# Patient Record
Sex: FEMALE | Race: WHITE
[De-identification: names, ages, dates, MRNs, and addresses within clinical notes are randomized per-mention and may not be internally consistent; named-entity substitution may affect disease eponyms.]

---

## 2019-03-12 ENCOUNTER — HOSPITAL ENCOUNTER (INPATIENT)
Dept: HOSPITAL 97 - ER | Age: 69
LOS: 10 days | DRG: 870 | End: 2019-03-22
Attending: INTERNAL MEDICINE | Admitting: INTERNAL MEDICINE
Payer: COMMERCIAL

## 2019-03-12 DIAGNOSIS — E86.0: ICD-10-CM

## 2019-03-12 DIAGNOSIS — N17.9: ICD-10-CM
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
## 2019-03-12 DIAGNOSIS — R65.20: ICD-10-CM

## 2019-03-12 DIAGNOSIS — Z78.1: ICD-10-CM

## 2019-03-12 DIAGNOSIS — K27.9: ICD-10-CM

## 2019-03-12 DIAGNOSIS — Z66: ICD-10-CM

## 2019-03-12 DIAGNOSIS — T42.4X5A: ICD-10-CM

## 2019-03-12 DIAGNOSIS — Z87.891: ICD-10-CM

## 2019-03-12 DIAGNOSIS — J96.02: ICD-10-CM

## 2019-03-12 DIAGNOSIS — E83.51: ICD-10-CM

## 2019-03-12 DIAGNOSIS — I13.0: ICD-10-CM

## 2019-03-12 DIAGNOSIS — E87.70: ICD-10-CM

## 2019-03-12 DIAGNOSIS — G92: ICD-10-CM

## 2019-03-12 DIAGNOSIS — I50.23: ICD-10-CM

## 2019-03-12 DIAGNOSIS — J96.01: ICD-10-CM

## 2019-03-12 DIAGNOSIS — I46.9: ICD-10-CM

## 2019-03-12 DIAGNOSIS — N18.4: ICD-10-CM

## 2019-03-12 DIAGNOSIS — Z94.0: ICD-10-CM

## 2019-03-12 DIAGNOSIS — J44.9: ICD-10-CM

## 2019-03-12 DIAGNOSIS — D63.8: ICD-10-CM

## 2019-03-12 DIAGNOSIS — E11.22: ICD-10-CM

## 2019-03-12 DIAGNOSIS — E44.0: ICD-10-CM

## 2019-03-12 DIAGNOSIS — B18.2: ICD-10-CM

## 2019-03-12 DIAGNOSIS — E87.5: ICD-10-CM

## 2019-03-12 DIAGNOSIS — E03.9: ICD-10-CM

## 2019-03-12 DIAGNOSIS — A41.9: Primary | ICD-10-CM

## 2019-03-12 DIAGNOSIS — F41.9: ICD-10-CM

## 2019-03-12 DIAGNOSIS — K74.60: ICD-10-CM

## 2019-03-12 DIAGNOSIS — J18.9: ICD-10-CM

## 2019-03-12 DIAGNOSIS — Z94.4: ICD-10-CM

## 2019-03-12 LAB
ALBUMIN SERPL BCP-MCNC: 2.8 G/DL (ref 3.4–5)
ALP SERPL-CCNC: 121 U/L (ref 45–117)
ALT SERPL W P-5'-P-CCNC: 21 U/L (ref 12–78)
AST SERPL W P-5'-P-CCNC: 13 U/L (ref 15–37)
BUN BLD-MCNC: 94 MG/DL (ref 7–18)
CKMB CREATINE KINASE MB: 3.1 NG/ML (ref 0.3–3.6)
COHGB MFR BLDA: 0.8 % (ref 0–1.5)
GLUCOSE SERPLBLD-MCNC: 143 MG/DL (ref 74–106)
HCT VFR BLD CALC: 30.9 % (ref 36–45)
INR BLD: 1.27
LIPASE SERPL-CCNC: 60 U/L (ref 73–393)
LYMPHOCYTES # SPEC AUTO: 0.2 K/UL (ref 0.7–4.9)
MORPHOLOGY BLD-IMP: (no result)
OXYHGB MFR BLDA: 96.8 % (ref 94–97)
PMV BLD: 9.3 FL (ref 7.6–11.3)
POTASSIUM SERPL-SCNC: 5.5 MMOL/L (ref 3.5–5.1)
RBC # BLD: 3.21 M/UL (ref 3.86–4.86)
SAO2 % BLDA: 98.7 % (ref 92–98.5)
TROPONIN (EMERG DEPT USE ONLY): 0.17 NG/ML (ref 0–0.04)
TSH SERPL DL<=0.05 MIU/L-ACNC: 2.87 UIU/ML (ref 0.36–3.74)
UA COMPLETE W REFLEX CULTURE PNL UR: (no result)
YEAST HYPHAE URNS QL MICRO: PRESENT

## 2019-03-12 PROCEDURE — 71045 X-RAY EXAM CHEST 1 VIEW: CPT

## 2019-03-12 PROCEDURE — 87070 CULTURE OTHR SPECIMN AEROBIC: CPT

## 2019-03-12 PROCEDURE — 94660 CPAP INITIATION&MGMT: CPT

## 2019-03-12 PROCEDURE — 81003 URINALYSIS AUTO W/O SCOPE: CPT

## 2019-03-12 PROCEDURE — 87040 BLOOD CULTURE FOR BACTERIA: CPT

## 2019-03-12 PROCEDURE — 84443 ASSAY THYROID STIM HORMONE: CPT

## 2019-03-12 PROCEDURE — 94003 VENT MGMT INPAT SUBQ DAY: CPT

## 2019-03-12 PROCEDURE — 82550 ASSAY OF CK (CPK): CPT

## 2019-03-12 PROCEDURE — 84484 ASSAY OF TROPONIN QUANT: CPT

## 2019-03-12 PROCEDURE — 87086 URINE CULTURE/COLONY COUNT: CPT

## 2019-03-12 PROCEDURE — 84100 ASSAY OF PHOSPHORUS: CPT

## 2019-03-12 PROCEDURE — 94760 N-INVAS EAR/PLS OXIMETRY 1: CPT

## 2019-03-12 PROCEDURE — 82553 CREATINE MB FRACTION: CPT

## 2019-03-12 PROCEDURE — 86900 BLOOD TYPING SEROLOGIC ABO: CPT

## 2019-03-12 PROCEDURE — 85025 COMPLETE CBC W/AUTO DIFF WBC: CPT

## 2019-03-12 PROCEDURE — 83690 ASSAY OF LIPASE: CPT

## 2019-03-12 PROCEDURE — 74018 RADEX ABDOMEN 1 VIEW: CPT

## 2019-03-12 PROCEDURE — 80202 ASSAY OF VANCOMYCIN: CPT

## 2019-03-12 PROCEDURE — 86850 RBC ANTIBODY SCREEN: CPT

## 2019-03-12 PROCEDURE — 85018 HEMOGLOBIN: CPT

## 2019-03-12 PROCEDURE — 36415 COLL VENOUS BLD VENIPUNCTURE: CPT

## 2019-03-12 PROCEDURE — 84439 ASSAY OF FREE THYROXINE: CPT

## 2019-03-12 PROCEDURE — 83880 ASSAY OF NATRIURETIC PEPTIDE: CPT

## 2019-03-12 PROCEDURE — 96372 THER/PROPH/DIAG INJ SC/IM: CPT

## 2019-03-12 PROCEDURE — 82962 GLUCOSE BLOOD TEST: CPT

## 2019-03-12 PROCEDURE — 0DH67UZ INSERTION OF FEEDING DEVICE INTO STOMACH, VIA NATURAL OR ARTIFICIAL OPENING: ICD-10-PCS

## 2019-03-12 PROCEDURE — 80048 BASIC METABOLIC PNL TOTAL CA: CPT

## 2019-03-12 PROCEDURE — 81015 MICROSCOPIC EXAM OF URINE: CPT

## 2019-03-12 PROCEDURE — 94002 VENT MGMT INPAT INIT DAY: CPT

## 2019-03-12 PROCEDURE — 80076 HEPATIC FUNCTION PANEL: CPT

## 2019-03-12 PROCEDURE — 70450 CT HEAD/BRAIN W/O DYE: CPT

## 2019-03-12 PROCEDURE — 83735 ASSAY OF MAGNESIUM: CPT

## 2019-03-12 PROCEDURE — 94640 AIRWAY INHALATION TREATMENT: CPT

## 2019-03-12 PROCEDURE — 84145 PROCALCITONIN (PCT): CPT

## 2019-03-12 PROCEDURE — 0BH17EZ INSERTION OF ENDOTRACHEAL AIRWAY INTO TRACHEA, VIA NATURAL OR ARTIFICIAL OPENING: ICD-10-PCS

## 2019-03-12 PROCEDURE — 99291 CRITICAL CARE FIRST HOUR: CPT

## 2019-03-12 PROCEDURE — 85610 PROTHROMBIN TIME: CPT

## 2019-03-12 PROCEDURE — 76770 US EXAM ABDO BACK WALL COMP: CPT

## 2019-03-12 PROCEDURE — 86901 BLOOD TYPING SEROLOGIC RH(D): CPT

## 2019-03-12 PROCEDURE — 87088 URINE BACTERIA CULTURE: CPT

## 2019-03-12 PROCEDURE — 93005 ELECTROCARDIOGRAM TRACING: CPT

## 2019-03-12 PROCEDURE — 85730 THROMBOPLASTIN TIME PARTIAL: CPT

## 2019-03-12 PROCEDURE — 5A1955Z RESPIRATORY VENTILATION, GREATER THAN 96 CONSECUTIVE HOURS: ICD-10-PCS

## 2019-03-12 PROCEDURE — 85014 HEMATOCRIT: CPT

## 2019-03-12 PROCEDURE — 83605 ASSAY OF LACTIC ACID: CPT

## 2019-03-12 PROCEDURE — 82805 BLOOD GASES W/O2 SATURATION: CPT

## 2019-03-12 PROCEDURE — 31500 INSERT EMERGENCY AIRWAY: CPT

## 2019-03-12 PROCEDURE — 87205 SMEAR GRAM STAIN: CPT

## 2019-03-12 RX ADMIN — Medication SCH ML: at 21:00

## 2019-03-12 RX ADMIN — SODIUM CHLORIDE SCH: 0.9 INJECTION, SOLUTION INTRAVENOUS at 17:00

## 2019-03-12 NOTE — RAD REPORT
EXAM DESCRIPTION:  RAD - Chest Single View - 3/12/2019 2:15 pm

 

CLINICAL HISTORY:  Cough and congestion, hypotension, bradycardia, anorexia

 

COMPARISON:  April 2016

 

TECHNIQUE:  AP portable chest image was obtained 1359 hours .

 

FINDINGS:  Lungs are fibrotic as a baseline. Focal mass density is present right suprahilar region. T
his was not present on the prior study. Pleural effusion is present on the right relatively small in 
size. Wedge-shaped opacification in the medial right base could be atelectasis, infiltrate or a combi
nation. There is a large left pleural effusion obscuring the left hemidiaphragm and left heart border
. There is likely atelectasis at the left base. Heart size is mostly obscured probably enlarged. Uppe
r lobe vasculature within normal limits. No pneumothorax. No acute bony abnormality seen. No acute ao
rtic findings suspected.

 

IMPRESSION:  Large left pleural effusion with likely atelectasis in the left base. Mass and infiltrat
e of the left base are not excluded.

 

Infiltrate, atelectasis or a combination in the medial right lung base.

 

Masslike density right suprahilar region. Mass an round pneumonia can have a similar appearance. Foll
ow-up CT chest imaging would be recommended, preferably with IV contrast.

## 2019-03-12 NOTE — RAD REPORT
EXAM DESCRIPTION:  RAD - Chest Single View - 3/12/2019 5:41 pm

 

CLINICAL HISTORY:  tube placement

Chest pain.

 

COMPARISON:  Chest Single View dated 3/12/2019; Chest Single View dated 4/5/2016

 

FINDINGS:  Portable technique limits examination quality.

 

Tip of the ET tube is above the claire. Small left pleural effusion is again noted. Right suprahilar 
density is again noted, without change since recent comparative study. Mildly impacted fracture the p
roximal left humerus is present.

## 2019-03-12 NOTE — RAD REPORT
EXAM DESCRIPTION:  CT - Head Brain Wo Cont - 3/12/2019 2:05 pm

 

CLINICAL HISTORY:  Hypotension, bradycardia, transient alteration of awareness

 

COMPARISON:  CT head April 2018

 

TECHNIQUE:  Axial 5 mm thick images of the head were obtained without IV contrast.

 

All CT scans are performed using dose optimization technique as appropriate and may include automated
 exposure control or mA/KV adjustment according to patient size.

 

FINDINGS:  No intracranial hemorrhage, mass, edema or shift of mid-line structures. No acute infarcti
on changes seen. No abnormal extra-axial fluid collections. Atrophy and chronic ischemic changes are 
present matching the comparison. Ventricles are in proportion to volume loss.

 

Partially visualized paranasal sinuses are clear. Right mastoid air cells are clear. There is partial
 opacification of the left mastoid air cells new from 2016.

 

No acute bony findings.

 

 

IMPRESSION:  Atrophy and chronic ischemic change similar to comparison. No acute intracranial finding
.

 

Suspected left mastoiditis. Correlation is needed with any symptoms.

## 2019-03-12 NOTE — XMS REPORT
Clinical Summary

 Created on:2019



Patient:Jewels Pfeiffer

Sex:Female

:1950

External Reference #:[id number]





Demographics







 Address  [address]



   [place] [address]

 

 Home Phone  [phone]

 

 Mobile Phone  [phone]

 

 Preferred Language  English

 

 Marital Status  Unknown

 

 [employer] Affiliation  Unknown

 

 Race  White

 

 Ethnic Group  Not  or 









Author







 Organization  [facility]

 

 Address  [address] [place] [address]









Support







 Name  Relationship  Address  Phone

 

 Jewels Pfeiffer  Unavailable  [address]  [phone]



     [place] [address]  









Care Team Providers







 Name  Role  Phone

 

 Nata Palomino MD  Primary Care Provider  [phone]









Allergies







 Active Allergy  Reactions  Severity  Noted Date  Comments

 

 Tape Adherent  Other (See Comments)    2013  TEARS SKIN







Medications







 Medication  Sig  Dispensed  Refills  Start Date  End Date  Status

 

 fluticasone-salmetero  Inhale 2 puffs by    0      Active



 l (ADVAIR) 100-50  mouth via inhaler          



 mcg/dose diskus  2 (two) times          



 inhalerIndications:  daily.          



 COPD Associated with            



 Chronic Bronchitis            

 

 ALPRAZolam (XANAX XR)  Take 0.5 mg by    0      Active



 0.5 MG 24 hr tablet  mouth 2 (two)          



   times daily.          

 

 tiotropium (SPIRIVA)  Inhale 18 mcg    0      Active



 18 mcg inhalation  into the lungs          



 capsule  daily.          

 

 ALBUTEROL SULFATE  Inhale 2 puffs by    0      Active



 (VENTOLIN HFA INHL)  mouth via inhaler          



   4 (four) times          



   daily.          

 

 levothyroxine  Take 125 mcg by    0      Active



 (SYNTHROID,  mouth daily.          



 LEVOTHROID) 125 MCG            



 tablet            

 

 guaiFENesin (MUCINEX)  Take 600 mg by    0      Active



 600 mg 12 hr tablet  mouth 2 (two)          



   times daily.          

 

 docusate sodium  Take 100 mg by    0      Active



 (COLACE) 100 MG  mouth 3 (three)          



 capsule  times daily.          

 

 gabapentin  Take 400 mg by    0      Active



 (NEURONTIN) 400 MG  mouth 4 (four)          



 capsuleIndications:  times daily.          



 Neuropathic Pain            

 

 metoprolol  Take 12.5 mg by    0      Active



 (LOPRESSOR) 25 MG  mouth 2 (two)          



 tablet  times daily .          

 

 oxyCODONE (OXYCONTIN)  Take 40 mg by    0      Active



 40 MG 12 hr tablet  mouth every 12          



   (twelve) hours.          

 

 predniSONE  Take 10 mg (2  40 tablet  1  2016    Active



 (DELTASONE) 10 MG  tabs) daily for 3          



 tablet  days, and then 5          



   mg daily until          



   told to stop by          



   Pulmonary          



   Medicine.          

 

 traMADol (ULTRAM) 50  Take 1 tablet (50  20 tablet  0  2016    Active



 mg tablet  mg total) by          



   mouth every 8          



   (eight) hours as          



   needed (moderate          



   pain). Max Daily          



   Amount: 150 mg          

 

 escitalopram oxalate  Take 1 tablet (10  30 tablet  1  2016    Active



 (LEXAPRO) 10 MG  mg total) by          



 tablet  mouth daily.          

 

 polyethylene glycol  Take 17 grams    0  2016    Active



 (GLYCOLAX) 17 gram  twice a day.          



 packet            

 

 tacrolimus (PROGRAF)  3 capsules AM/ 2  150 capsule  11  2017    Active



 1 MG capsule  capsules PM.          







Active Problems







 Problem  Noted Date

 

 Anxiety  2016

 

 Acute on chronic respiratory failure with hypoxia  2016

 

 Elevated liver enzymes  2016

 

 Slow transit constipation  2016

 

 Chronic indwelling Gonsales catheter  2016

 

 Hyponatremia  2016

 

 Acute kidney injury  2016

 

 Chronic diastolic CHF (congestive heart failure)  2016

 

 Lung nodule  2016

 

 COPD exacerbation  11/10/2016

 

 Tibial fracture  2016

 

 Immunosuppressed status  2014

 

 Left bundle branch block  2014

 

 Femur fracture, right  2014

 

 Status post liver transplant  2013









 Last Assessment & Plan: 







 Stable LFT. Continue immunosuppresion









 Hepatitis C  2013









 Last Assessment & Plan: 







 HCV RNA undetectable . Likely has SVR. Will recheck HCV RNA









 Hypothyroid  2013

 

 COPD with asthma  2013









 Last Assessment & Plan: 







 To follow with pulmonary.









 Depression  2013









 Last Assessment & Plan: 







 Needs to see Dr. Ruff









 Cervical stenosis of spinal canal  2013









 Last Assessment & Plan: 







 S/p Laminectomy. To follow with Neurosurgery.







Immunizations







 Name  Dates Previously Given  Next Due

 

 Influenza Three-TIV PF 5+ YRS 2016  







Social History







 Tobacco Use  Types  Packs/Day  Years Used  Date

 

 Current Every Day Smoker    1  45  









 Smokeless Tobacco: Never Used      









 Tobacco Cessation: Ready to Quit: Yes









 Alcohol Use  Drinks/Week  oz/Week  Comments

 

 No      









 Sex Assigned at Birth  Date Recorded

 

 Not on file  









 Job Start Date  Occupation  Industry

 

 Not on file  Not on file  Not on file









 Travel History  Travel Start  Travel End









 No recent travel history available.







Last Filed Vital Signs

Not on file



Plan of Treatment







 Health Maintenance  Due Date  Last Done  Comments

 

 INFLUENZA VACCINE  10/01/2018    







Implants







 Implanted  Type  Area    Device  Shelf  Model /



         Identifier  Expiration  Serial /



           Date  Lot

 

 Sealant,Floseal Hemostatic Matrix 10ml - [id number]  Cement/F  N/A: Spine  [place]
    2014  [id number] /



 Implanted: Qty: 1 on 2013 by Bruce Valdez MD  iller/Ad  Cervical  
BIOSCIENCE       /



   hesive    FORMER FUSION      [id number]



       MEDICAL      

 

 Bone Graft,Sub Vitoss Foam Pack Bioactive 10cc - [id number]  Cement/F  N/A: Spine
  FLO SPINE    2015  [id number] /



 Implanted: Qty: 1 on 2013 by Bruce Valdez MD  iller/Ad  Cervical  
       /



   hesive          [id number]

 

 K-Wire,Gamma3 Small 3.4v500kd - [id number]  Fracture  Right: Hip  [place]    2019  [id number] /



 Implanted: Qty: 2 on 2014 by Fran Obando MD  /Fixatio    
Orthopaedics       /



   n          [id number]

 

 Nail,Gamma3 Trochanteric Ti Kit [id number] 125d - [id number]  Fracture  Right:  
[place]    2019  [id number] /



 Implanted: Qty: 1 on 2014 by Fran Obando MD  /Fixatio  Femur  
Orthopaedics       /



   n          [id number]

 

 Screw,Lag Gamma3 Ti 10.5x90mm - [id number]  Fracture  Right:  Flo    2019  [id number] /



 Implanted: Qty: 1 on 2014 by Fran Obando MD  /Fixatio  Femur  
Orthopaedics       /



   n          [id number]

 

 Screw,Locking Fully Threaded 5x37.5mm Ti T2 - [id number]  Fracture  Right:  
[place]    2019  [id number] /



 Implanted: Qty: 1 on 2014 by Fran Obando MD  /Fixatio  Femur  
Orthopaedics       /



   n          [id number]

 

 Screw,Polyaxial Canc Oasys 3.5x12mm - [id number]  Spine  N/A: Spine  FLO [place]
      [id number] /



 Implanted: Qty: 8 on 2013 by Bruce Valdez MD    Cervical         /



             [id number]

 

 Sergei,Oasys Ti 3.5x60mm - [id number]  Spine  N/A: Spine  [employer] [place]      [id number] 
/



 Implanted: Qty: 2 on 2013 by Bruce Valdez MD    Cervical         /



             NOT AVAILABLE

 

 Blocker,Oasys - [id number]  Spine  N/A: Spine  [employer] [place]      [id number] /



 Implanted: Qty: 8 on 2013 by Bruce Valdez MD    Cervical         /



             NOT AVAILABLE







Results

Not on fileafter 2018



Insurance







 Payer  Benefit Plan / Group  Subscriber ID  Type  Phone  Address

 

 MEDICARE  MEDICARE A B  xxxxxxxxxx  Medicare    









 Guarantor Name  Account Type  Relation to  Date of  Phone  Billing



     Patient  Birth    Address

 

 Jewels Pfeiffer  Personal/Family  Self  1950  [phone]  [address]        ([place])  [place], [place]



           [address]







Advance Directives

For more information, please contact:[address] [phone]





 Code Status  Date Activated  Date Inactivated  Comments

 

 Full Code  11/10/2016  3:25 AM  2016  8:13 PM  









 This code status was determined by:  Patient  









       

 

 Full Code  2016  8:01 PM  2016  4:28 PM  









 This code status was determined by:  Patient  









       

 

 Full Code  2014  8:49 PM  2014  5:54 PM  









 This code status was determined by:  Patient  









       

 

 Code ONE  2013  2:35 PM  2013  5:54 PM  All possible means of support
, including: cardiac massage, mechanical ventilation, and defibrillation will 
be used to support life.

## 2019-03-12 NOTE — EDPHYS
Physician Documentation                                                                           

 [facility]                                                                

Name: Jewels Pfeiffer                                                                               

Age: 68 yrs                                                                                       

Sex: Female                                                                                       

: 1950                                                                                   

MRN: [id number]                                                                                   

Arrival Date: 2019                                                                          

Time: 13:04                                                                                       

Account#: [id number]                                                                            

Bed 3                                                                                             

Private MD:                                                                                       

ED Physician Ana Hamm                                                                    

HPI:                                                                                              

                                                                                             

13:20 This 68 yrs old  Female presents to ER via EMS with complaints of Blood        ma2 

      Pressure Problem.                                                                           

13:20 This 68 yrs old  Female presents to ER via EMS with complaints of Blood        ma2 

      Pressure Problem.                                                                           

13:20 The patient presents with decreased mental status, decreased responsiveness. Onset: The ma2 

      symptoms/episode began/occurred gradually, 3 day(s) ago. Possible causes: sepsis.           

      Associated signs and symptoms: Pertinent negatives: ataxia, chest pain, diaphoresis.        

      Current symptoms: In the emergency department the patient's symptoms are unchanged from     

      the initial presentation. Patient's baseline: Neuro: alert but confused. The patient        

      has experienced similar episodes in the past.                                               

                                                                                                  

Historical:                                                                                       

- Allergies:                                                                                      

13:37 No Known Allergies;                                                                     bp  

- Home Meds:                                                                                      

13:37 alprazolam 0.5 mg Oral Tb24 1 tab twice a day [Active]; aspirin 81 mg Oral chew 1 tab   bp  

      once daily [Active]; ascorbic acid (vitamin C) 500 mg tab [Active]; budesonide 0.5 mg/2     

      mL inhalation nbsp 2 mL 2 times per day [Active]; docusate sodium 100 mg Oral tab 1 tab     

      2 times per day [Active]; folic acid 1 mg Oral tab 1 tab once daily [Active];               

      furosemide 40 mg Oral tab 1 tab once daily [Active]; gabapentin 400 mg oral cap 1 cap       

      every 6 hours [Active]; levothyroxine 125 mcg tab 1 tab once daily [Active]; metoprolol     

      tartrate 25 mg Oral tab 1 tab 2 times per day [Active];                                     

- PMHx:                                                                                           

13:37 Pneumonia; Hepatitis; Hypothyroidism; Anxiety; CHF; Renal Disease;                      bp  

                                                                                                  

- Immunization history:: Adult Immunizations up to date.                                          

- Social history:: Smoking status: Patient/guardian denies using tobacco,                         

  Patient/guardian denies using alcohol, street drugs, The patient lives with family,             

  in a nursing home.                                                                              

- Ebola Screening: : Patient negative for fever greater than or equal to 101.5 degrees            

  Fahrenheit, and additional compatible Ebola Virus Disease symptoms Patient denies               

  exposure to infectious person Patient denies travel to an Ebola-affected area in the            

  21 days before illness onset No symptoms or risks identified at this time.                      

- Family history:: not pertinent.                                                                 

                                                                                                  

                                                                                                  

ROS:                                                                                              

13:20 Unable to obtain ROS due to altered mental status.                                      ma2 

15:30 Neck: Negative for injury, pain, and swelling.                                          ma2 

                                                                                                  

Exam:                                                                                             

13:20 Eyes:  Pupils equal round and reactive to light, extra-ocular motions intact.  Lids and ma2 

      lashes normal.  Conjunctiva and sclera are non-icteric and not injected.  Cornea within     

      normal limits.  Periorbital areas with no swelling, redness, or edema. Chest/axilla:        

      Normal chest wall appearance and motion.  Nontender with no deformity.  No lesions are      

      appreciated. Cardiovascular:  Regular rate and rhythm with a normal S1 and S2.  No          

      gallops, murmurs, or rubs.  Normal PMI, no JVD.  No pulse deficits. Respiratory:  Lungs     

      have equal breath sounds bilaterally, clear to auscultation and percussion.  No rales,      

      rhonchi or wheezes noted.  No increased work of breathing, no retractions or nasal          

      flaring. Abdomen/GI:  Soft, non-tender, with normal bowel sounds.  No distension or         

      tympany.  No guarding or rebound.  No evidence of tenderness throughout.                    

13:20 Neuro: Orientation: unable to test, Mentation: responsive to voice unable to follow         

      commands.                                                                                   

                                                                                                  

Vital Signs:                                                                                      

13:09 BP 94 / 49; Pulse 47; Resp 9; Temp 97.1; Pulse Ox 97% ; Weight 58.97 kg;                bp  

14:00 BP 94 / 52; Pulse 50; Resp 18; Pulse Ox 97% ;                                           bp  

15:00  / 66; Pulse 56; Resp 15; Pulse Ox 97% ;                                          bp  

16:00  / 76; Pulse 50; Resp 17; Pulse Ox 97% ;                                          bp  

17:00  / 54; Pulse 45; Resp 17; Pulse Ox 100% ;                                         bp  

18:00  / 91; Pulse 42; Resp 18; Pulse Ox 99% ;                                          bp  

19:15  / 69; Pulse 43; Resp 16; Temp 90.8(R); Pulse Ox 100% on 40% FiO2 ETT vent;       ea  

19:45  / 66; Pulse 43; Resp 16; Pulse Ox 100% on 40% FiO2 ETT vent;                     ea  

20:00  / 70; Pulse 46; Resp 16; Pulse Ox 100% on 40% FiO2 ETT vent;                     ea  

21:00  / 64; Pulse 49; Resp 16; Pulse Ox 100% on 40% FiO2 ETT vent;                     ea  

19:15 Pt placed on bear hugger                                                                ea  

                                                                                                  

Procedures:                                                                                       

17:14 Intubation: Ventilated with 100% NRB prior to procedure. Intubated orally using # 4     ma2 

      Isa blade with 7.5 mm ETT. was successful on first attempt. Ventilated with           

      ventilator. Tube secured with tape with ETT garcia Placement verified by CXR, Patient       

      tolerated well.                                                                             

                                                                                                  

MDM:                                                                                              

13:06 Patient medically screened.                                                             ma2 

13:20 Differential Diagnosis: alcohol intoxication, overdose, pneumonia, sepsis, UTI, volume  ma2 

      depletion.                                                                                  

15:03 Data reviewed: vital signs, nurses notes. Counseling: I had a detailed discussion with  brenda 

      the patient and/or guardian regarding: the historical points, exam findings, and any        

      diagnostic results supporting the discharge/admit diagnosis, the presence of at least       

      one elevated blood pressure reading (>120/80) during this emergency department visit,       

      the need for further work-up and treatment in the hospital. Response to treatment: the      

      patient's symptoms have markedly improved after treatment. ED course: NSTEMI, EKG           

      showing sinus bradycardia blood pressure is borderline, improved with ivf , pupils are      

      pin pointed and her mentation improved with Narcan.. .                                      

15:29 Physician consultation: Iva Fuller MD. ED course: improved with narcan has nstemi .  ma 

16:11 ED course: dr. fuller advise to put admission orders under dr. mendes and that she has   brenda 

      discussed the case with him and advise to order inpatients orders and antibiotics .         

17:06 ED course: patient intubated for respiratory distress, and hypercapnia.. I updated dr. brenda Palomino about that .                                                                        

17:07 ED course: i searched nursing home note for code status, no mentions of DNR on nursing  [facility] 

      home or previous visit patient unable to answer questions and she has no known none         

      reversible lung condition .                                                                 

                                                                                                  

                                                                                             

13:09 Order name: Urine Culture                                                               aa5 

                                                                                             

13:19 Order name: Basic Metabolic Panel                                                       [facility] 

                                                                                             

13:19 Order name: Blood Culture Adult (2)                                                     [facility] 

                                                                                             

13:19 Order name: CBC with Diff                                                               [facility] 

                                                                                             

13:19 Order name: Ckmb; Complete Time: 15:01                                                  [facility] 

                                                                                             

13:19 Order name: CPK; Complete Time: 15:01                                                   [facility] 

                                                                                             

13:19 Order name: Lactate; Complete Time: 15:                                               [facility] 

                                                                                             

13:19 Order name: LFT's; Complete Time: 15:01                                                 [facility] 

                                                                                             

13:19 Order name: Lipase; Complete Time: 15:                                                [facility] 

                                                                                             

13:19 Order name: Procalcitonin; Complete Time: 15:                                         [facility] 

                                                                                             

13:19 Order name: Protime (+inr); Complete Time: 15:                                        [facility] 

                                                                                             

13:19 Order name: Ptt, Activated; Complete Time: 15:                                        [facility] 

                                                                                             

13:19 Order name: Troponin (emerg Dept Use Only); Complete Time: 15:                        [facility] 

                                                                                             

13:19 Order name: Urine Microscopic Only                                                      [facility] 

                                                                                             

13:39 Order name: Basic Metabolic Panel; Complete Time: 15:                                 [facility]

                                                                                             

14:00 Order name: TSH; Complete Time: 15:                                                   [facility] 

                                                                                             

14:00 Order name: T4 Free; Complete Time: 15:01                                               [facility] 

                                                                                             

14:13 Order name: Urine Dipstick--Ancillary (enter results); Complete Time: 16:10             2 

                                                                                             

16:11 Order name: Arterial Blood Gas                                                          bp  

                                                                                             

16:16 Order name: Basic Metabolic Panel                                                       [facility]

                                                                                             

16:16 Order name: Basic Metabolic Panel                                                       [facility]

                                                                                             

16:16 Order name: CBC with Automated Diff                                                     [facility]

                                                                                             

16:16 Order name: CBC with Automated Diff                                                     [facility]

                                                                                             

16:16 Order name: NT PRO-BNP                                                                  [facility]

                                                                                             

16:16 Order name: NT PRO-BNP                                                                  [facility]

                                                                                             

16:16 Order name: Troponin I                                                                  [facility]

                                                                                             

16:16 Order name: Troponin I                                                                  [facility]

                                                                                             

16:16 Order name: Troponin I                                                                  [facility]

                                                                                             

16:22 Order name: Troponin I                                                                  [facility]

                                                                                             

16:22 Order name: Troponin I                                                                  [facility]

                                                                                             

13:19 Order name: Chest Single View XRAY; Complete Time: 15:01                                ma2 

                                                                                             

13:19 Order name: Accucheck; Complete Time: 15:14                                             ma2 

                                                                                             

13:19 Order name: Cardiac monitoring; Complete Time: 13:23                                    ma2 

                                                                                             

13:19 Order name: EKG - Nurse/Tech; Complete Time: 13:22                                      ma2 

                                                                                             

13:19 Order name: IV Saline Lock - Large Bore; Complete Time: 14:00                           ma2 

                                                                                             

13:19 Order name: Labs collected and sent; Complete Time: 14:00                               ma2 

                                                                                             

13:19 Order name: O2 Per Protocol; Complete Time: 13:22                                       ma2 

                                                                                             

13:19 Order name: O2 Sat Monitoring; Complete Time: 13:22                                     ma2 

                                                                                             

13:19 Order name: Urine Dipstick-Ancillary (obtain specimen); Complete Time: 13:22            ma2 

                                                                                             

13:19 Order name: CT Head Brain wo Cont; Complete Time: 14:22                                 ma2 

                                                                                             

15:19 Order name: EKG Electrocardiogram                                                       [facility]

                                                                                             

16:16 Order name: Regular                                                                     [facility]

                                                                                             

16:16 Order name: EKG Electrocardiogram                                                       [facility]

                                                                                             

16:16 Order name: EKG Electrocardiogram                                                       [facility]

                                                                                             

16:22 Order name: EKG Electrocardiogram                                                       [facility]

                                                                                             

16:22 Order name: EKG Electrocardiogram                                                       [facility]

                                                                                             

16:22 Order name: Troponin I                                                                  [facility]

                                                                                             

17:15 Order name: Chest Single View XRAY                                                      ma2 

                                                                                             

17:21 Order name: Restraint:Non-Violent; Complete Time: 17:55                                 bp  

                                                                                             

17:48 Order name: RAD                                                                         [facility]

                                                                                                  

Administered Medications:                                                                         

13:45 Drug: Cefepime 1 grams Route: IVPB; Rate: 200 ml/hr; Infused Over: 30 mins; Site: right bp  

      forearm;                                                                                    

15:43 Follow up: IV Status: Completed infusion                                                bp  

13:59 Drug: Atropine 0.1 mg Route: IVP; Site: right forearm;                                  bp  

15:13 Follow up: Response: No adverse reaction                                                bp  

14:00 Drug: NS 0.9% (30 ml/kg) 30 ml/kg Route: IV; Rate: bolus; Site: left antecubital;       hj  

18:46 Follow up: IV Status: Completed infusion; IV Intake: 1769ml                             bp  

14:30 Drug: Albuterol - atroVENT (3:1) (2.5 mg - 0.5 mg) 3 ml Route: Nebulizer;               bp  

15:17 Follow up: Response: No adverse reaction                                                bp  

14:30 Drug: NARcan 0.4 mg Route: IVP; Site: right antecubital;                                bp  

16:01 Follow up: Response: Marked relief of symptoms                                          bp  

15:00 Drug: Ativan 2 mg Route: IVP; Site: right antecubital;                                  bp  

16:01 Follow up: Response: Marked relief of symptoms                                          bp  

15:43 Drug: vancoMYCIN 1 grams Route: IVPB; Infused Over: 2 hrs; Site: right antecubital;     bp  

16:05 Drug: Lovenox 80 mg Route: Sub-Q; Site: right lower abdomen;                            bp  

16:17 Follow up: Response: No adverse reaction                                                bp  

16:12 Not Given (pt not tolerating po): Aspirin Chewable Tablet 324 mg PO once; 81 mg tablets bp  

      x 4                                                                                         

17:00 Drug: Etomidate 20 mg Route: IVP; Site: left forearm;                                   hj  

17:19 Follow up: Response: No adverse reaction                                                bp  

17:00 Drug: Succinylcholine 120 mg Route: IVP; Site: left forearm;                            hj  

17:32 Follow up: Response: No adverse reaction                                                bp  

17:45 Drug: Propofol 5 mcg/kg/min Route: IV; Rate: calculated rate; Site: right antecubital;  bp  

                                                                                                  

                                                                                                  

Disposition:                                                                                      

17:14 Critical Care:.                                                                         ma2 

                                                                                                  

Disposition:                                                                                      

19 15:30 Hospitalization ordered by Iva Fuller for Inpatient Admission. Preliminary     

  diagnosis are Non-ST elevation (NSTEMI) myocardial infarction, Altered mental                   

  status, unspecified.                                                                            

- Bed requested for Intensive Care Unit.                                                          

- Status is Inpatient Admission.                                                              ea  

- Condition is Stable.                                                                            

- Problem is new.                                                                                 

- Symptoms are unchanged.                                                                         

UTI on Admission? No                                                                              

                                                                                                  

                                                                                                  

                                                                                                  

Critical care time excluding procedures:                                                          

17:14 Critical care time: Bedside Care: 30 minutes, Consultation: 20 minutes. Total time: 50  ma2 

      minutes                                                                                     

                                                                                                  

Signatures:                                                                                       

Dispatcher [employer]                           [facility] Henry, RN RN hj Antunez, Elena, RN RN ea Peltier, Brian, RN RN bp Alzahri, Mohammad, MD MD   ma2                                                  

                                                                                                  

Corrections: (The following items were deleted from the chart)                                    

17:04 15:30 Hospitalization Ordered by Iva Fuller MD for Inpatient Admission. Preliminary  ma2 

      diagnosis is Non-ST elevation (NSTEMI) myocardial infarction; Altered mental status,        

      unspecified. Bed requested for Telemetry/MedSurg (Inpatient). Status is Inpatient           

      Admission. Condition is Stable. Problem is new. Symptoms are unchanged. UTI on              

      Admission? No. ma2                                                                          

18:07 17:04 2019 15:30 Hospitalization Ordered by Iva Fuller MD for Inpatient        bd  

      Admission. Preliminary diagnosis is Non-ST elevation (NSTEMI) myocardial infarction;        

      Altered mental status, unspecified. Bed requested for Intensive Care Unit. Status is        

      Inpatient Admission. Condition is Stable. Problem is new. Symptoms are unchanged. UTI       

      on Admission? No. ma2                                                                       

21:30 18:07 2019 15:30 Hospitalization Ordered by Iva Fuller MD for Inpatient        ea  

      Admission. Preliminary diagnosis is Non-ST elevation (NSTEMI) myocardial infarction;        

      Altered mental status, unspecified. Bed requested for Intensive Care Unit. Status is        

      Inpatient Admission. Condition is Stable. Problem is new. Symptoms are unchanged. UTI       

      on Admission? No. bd                                                                        

                                                                                                  

**************************************************************************************************

## 2019-03-12 NOTE — XMS REPORT
Patient Summary Document

 Created on:2019



Patient:ISIDRO MCMANUS

Sex:Female

:1950

External Reference #:[id number]





Demographics







 Address  [address] [place] [address]

 

 Home Phone  [phone]

 

 Work Phone  [phone]

 

 Email Address  NONE

 

 Preferred Language  Unknown

 

 Marital Status  Unknown

 

 Latter day Affiliation  Unknown

 

 Race  Unknown

 

 Additional Race(s)  Unavailable

 

 Ethnic Group  Unknown









Author







 Organization  [facility]

 

 Address  [address] [place] [address] [address]. Seg[address] [place] [id number]

 

 Phone  [phone]









Care Team Providers







 Name  Role  Phone

 

 Unavailable  Unavailable  Unavailable









Problems

This patient has no known problems.



Allergies, Adverse Reactions, Alerts

This patient has no known allergies or adverse reactions.



Medications

This patient has no known medications.

## 2019-03-12 NOTE — ER
Nurse's Notes                                                                                     

 [facility]                                                                

Name: Jewels Pfeiffer                                                                               

Age: 68 yrs                                                                                       

Sex: Female                                                                                       

: 1950                                                                                   

MRN: [id number]                                                                                   

Arrival Date: 2019                                                                          

Time: 13:04                                                                                       

Account#: [id number]                                                                            

Bed 3                                                                                             

Private MD:                                                                                       

Diagnosis: Non-ST elevation (NSTEMI) myocardial infarction;Altered mental status, unspecified     

                                                                                                  

Presentation:                                                                                     

                                                                                             

13:09 Presenting complaint: EMS states: HYPOTENSIVE AND DEEDEE, ANOREXIC x3 DAYS PER [facility]  

      STAFF. Transition of care: patient was received from another setting of care (long-term     

      care facility), [facility]. Onset of symptoms is unknown. Risk Assessment:     

      Do you want to hurt yourself or someone else? Patient reports no desire to harm self or     

      others. Initial Sepsis Screen: Does the patient meet any 2 criteria? Altered Mental         

      Status. No. Patient's initial sepsis screen is negative. Does the patient have a            

      suspected source of infection? Yes: Catheter related infection                              

      (Gonsales/dialysis/PICC/central line) If YES to both, name of provider notified: Ana Hamm MD. Care prior to arrival: None.                                                    

13:09 Method Of Arrival: EMS: [facility]                                                       bp  

13:09 Acuity: NICOLA 2                                                                           bp  

                                                                                                  

Triage Assessment:                                                                                

13:09 General: Appears unkempt, malnourished, Behavior is listless. Pain: Unable to use pain  bp  

      scale. Does not appear to understand pain scale. EENT: No deficits noted. Neuro: Level      

      of Consciousness is obtunded, Oriented to none. Cardiovascular: Rhythm is sinus             

      bradycardia. Respiratory: Airway is patent Respiratory effort is even, shallow,             

      Respiratory pattern is regular, symmetrical. GI: No signs and/or symptoms were reported     

      involving the gastrointestinal system. : Gonsales in place to gravity drainage Urine is      

      cloudy. Derm: No deficits noted. Musculoskeletal: Circulation, motion, and sensation        

      intact.                                                                                     

                                                                                                  

Historical:                                                                                       

- Allergies:                                                                                      

13:37 No Known Allergies;                                                                     bp  

- Home Meds:                                                                                      

13:37 alprazolam 0.5 mg Oral Tb24 1 tab twice a day [Active]; aspirin 81 mg Oral chew 1 tab   bp  

      once daily [Active]; ascorbic acid (vitamin C) 500 mg tab [Active]; budesonide 0.5 mg/2     

      mL inhalation nbsp 2 mL 2 times per day [Active]; docusate sodium 100 mg Oral tab 1 tab     

      2 times per day [Active]; folic acid 1 mg Oral tab 1 tab once daily [Active];               

      furosemide 40 mg Oral tab 1 tab once daily [Active]; gabapentin 400 mg oral cap 1 cap       

      every 6 hours [Active]; levothyroxine 125 mcg tab 1 tab once daily [Active]; metoprolol     

      tartrate 25 mg Oral tab 1 tab 2 times per day [Active];                                     

- PMHx:                                                                                           

13:37 Pneumonia; Hepatitis; Hypothyroidism; Anxiety; CHF; Renal Disease;                      bp  

                                                                                                  

- Immunization history:: Adult Immunizations up to date.                                          

- Social history:: Smoking status: Patient/guardian denies using tobacco,                         

  Patient/guardian denies using alcohol, street drugs, The patient lives with family,             

  in a nursing home.                                                                              

- Ebola Screening: : Patient negative for fever greater than or equal to 101.5 degrees            

  Fahrenheit, and additional compatible Ebola Virus Disease symptoms Patient denies               

  exposure to infectious person Patient denies travel to an Ebola-affected area in the            

  21 days before illness onset No symptoms or risks identified at this time.                      

- Family history:: not pertinent.                                                                 

                                                                                                  

                                                                                                  

Screenin:21 Abuse screen: Denies threats or abuse. Denies injuries from another. Nutritional        bp  

      screening: No deficits noted. Tuberculosis screening: No symptoms or risk factors           

      identified. Fall Risk No fall in past 12 months (0 pts). Secondary diagnosis (15            

      points) Alzheimer's, No IV (0 pts). Ambulatory Aid- None/Bed Rest/Nurse Assist (0 pts).     

      Gait- Normal/Bed Rest/Wheelchair (0 pts) Mental Status- Overestimates/Forgets               

      Limitations (15 pts.). Total Gold Fall Scale indicates Low Risk Score (25-44 pts).         

      Fall prevention measures have been instituted. Side Rails Up X 2 Placed close to            

      Nursing Station Frequent Obs/Assesments occuring As available Patient and Family            

      Educated on Fall Prevention Program and strategies.                                         

                                                                                                  

Assessment:                                                                                       

13:10 General: SEE TRIAGE NOTE.                                                               bp  

14:00 Reassessment: PT CONTINUES LETHARGIC, PUPILS CONSTRICTED AND RESP SHALLOW. MD INFORMED. bp  

15:00 Reassessment: AFTER NARCAN, PT MARKEDLY MORE AWAKE. AOx0, AGITATED AND PULLING AT PIV   bp  

      AND OXYGEN, MD INFORMED.                                                                    

16:50 Reassessment: RT in room for ABG;.                                                      hj  

16:55 Reassessment: provider decided to intubate pt with CO2 over 200's; RT paged and RSI kit hj  

      prepped;.                                                                                   

17:00 Reassessment: provider in room; RT and nurses in room with needed equipments            hj  

      available;. Reassessment: etomidate 20/ succ 120 given;.                                    

17:03 Reassessment: 7.5 ET tube inserted, 21 on the lip; auscultated with good breath sounds  hj  

      and good color;.                                                                            

17:15 Reassessment: RESTRAINTS PLACED FOR PT SAFETY AND AIRWAY PROTECTION. PT AOx0, DOES NOT  bp  

      RESPOND TO VERBAL COMMANDS, UNABLE TO PARTICIPATE IN HEALTH CARE ACTIVITIES. RESTRAINT      

      RELEASE CRITERIA EXPLAINED BUT PT UNABLE TO UNDERSTAND.                                     

18:30 Reassessment: FAMILY AT B/S WITH MD. RESTRAINTS IN PLACE FOR AIRWAY PROTECTION AND PT   bp  

      SAFETY. PROPOFOL GTT \T\ 20MCG/KG/MIN. ICU ADMIT IN PROCESS.                                  

19:00 General: Appears slender, Pt is sedated and intubated. Restraints remain in place for   ea  

      airway protection. Propofol at 20mcg/kg/min.. Respiratory: Airway via oral intubation       

      Respiratory effort is even, unlabored, Respiratory pattern is regular, symmetrical. :     

      Gonsales in place to gravity drainage. Derm: Skin is dry, Skin is pale, Skin temperature       

      is cool Bear Hugger placed on pt.                                                           

20:21 Reassessment: Patient and/or family updated on plan of care and expected duration. Pain ea  

      level reassessed. Pt remains sedated and intubated. No s/s of pain or discomfort noted      

      at this time. Restraints remain in place. Pt on Bear Hugger. Report called to ICU.          

                                                                                                  

Vital Signs:                                                                                      

13:09 BP 94 / 49; Pulse 47; Resp 9; Temp 97.1; Pulse Ox 97% ; Weight 58.97 kg;                bp  

14:00 BP 94 / 52; Pulse 50; Resp 18; Pulse Ox 97% ;                                           bp  

15:00  / 66; Pulse 56; Resp 15; Pulse Ox 97% ;                                          bp  

16:00  / 76; Pulse 50; Resp 17; Pulse Ox 97% ;                                          bp  

17:00  / 54; Pulse 45; Resp 17; Pulse Ox 100% ;                                         bp  

18:00  / 91; Pulse 42; Resp 18; Pulse Ox 99% ;                                          bp  

19:15  / 69; Pulse 43; Resp 16; Temp 90.8(R); Pulse Ox 100% on 40% FiO2 ETT vent;       ea  

19:45  / 66; Pulse 43; Resp 16; Pulse Ox 100% on 40% FiO2 ETT vent;                     ea  

20:00  / 70; Pulse 46; Resp 16; Pulse Ox 100% on 40% FiO2 ETT vent;                     ea  

21:00  / 64; Pulse 49; Resp 16; Pulse Ox 100% on 40% FiO2 ETT vent;                     ea  

19:15 Pt placed on bear hugger                                                                ea  

                                                                                                  

ED Course:                                                                                        

13:04 Patient arrived in ED.                                                                  hj  

13:06 Ana Hamm MD is Attending Physician.                                           ma2 

13:07 Ollie Seymour, RN is Primary Nurse.                                                    bp  

13:09 Arm band placed on.                                                                     bp  

13:11 Triage completed.                                                                       bp  

13:18 EKG done, by EKG tech. reviewed by Ana Hamm MD.                                 vh  

13:20 Inserted saline lock: 22 gauge in right antecubital area, using aseptic technique.      bp  

      Blood collected.                                                                            

13:21 Patient has correct armband on for positive identification. Placed in gown. Bed in low  bp  

      position. Call light in reach. Side rails up X2.                                            

13:40 Inserted saline lock: 20 gauge in left forearm, using aseptic technique. Blood          ag  

      collected.                                                                                  

13:45 Inserted saline lock: 20 gauge in left forearm, using aseptic technique.                bp  

14:02 CT completed. Pt tolerated procedure poorly. Patient taken to an exam room, Patient     sw  

      moved to CT via stretcher. Patient moved back from CT.                                      

14:04 CT Head Brain wo Cont In Process Unspecified.                                           EDMS

14:14 X-ray completed. Portable x-ray completed in exam room. Patient tolerated procedure     jb2 

      well.                                                                                       

14:16 Chest Single View XRAY In Process Unspecified.                                          EDMS

15:29 Iva Marie MD is Hospitalizing Provider.                                           ma2 

17:18 Assisted provider with intubation using 7.5 mm ETT via oral route. ET tube secured at   bp  

      21cm at the teeth. Set up intubation tray. Intubated by Ana Hamm MD Placement       

      verified by CO2 detector w/ + color change, auscultating bilateral breath sounds,           

      Patient tolerated well.                                                                     

20:30 Patient admitted, IV remains in place.                                                  ea  

21:19 Primary Nurse role handed off by Ollie Seymour, RN                                     ed1 

                                                                                                  

Restraints:                                                                                       

17:15 Non-Violent Restraint: Order obtained. Initiated on 2019 at 17:15 Restraint   bp  

      Education provided to family/significant other/legally authorized representative.           

      Actions/Behavior observed: Confused/disoriented, has difficulty remembering/follow          

      instructions, has impaired decision making, unable to follow instructions, repeated         

      attempts to remove artifical airway/mechanical resp support, Less restrictive               

      alternatives attempted: decrease environmental stimuli, 1:1 patient care, placed near       

      Nurse station, reoriented to location, medications evaluated, lines/tubes covered,          

      Alternative interventions: Ineffective. Clinical justification for use: airway              

      protection, line protection, patient safety, Mental status: agitated/restless,              

      confused, Cognition: poor judgement, poor safety awareness, poor                            

      attention/concentration, unable to follow commands, Circulation: Within defined             

      parameters (based on Cardiovascular assessment) Skin integrity: Within defined              

      parameters (based on Integumentary assessment) Signs of injury related to restraint: No     

      injuries noted. Range of Motion (ROM): performed. Hydration/Food: patient declined.         

      Elimination/Hygiene: with urinary catheter, Restraint status: Soft wrist restraint          

      (Right) Started. Soft wrist restraint (Left) Started. Criteria to discontinue Restraint     

      not met. Restraint continued.                                                               

                                                                                                  

Administered Medications:                                                                         

13:45 Drug: Cefepime 1 grams Route: IVPB; Rate: 200 ml/hr; Infused Over: 30 mins; Site: right bp  

      forearm;                                                                                    

15:43 Follow up: IV Status: Completed infusion                                                bp  

13:59 Drug: Atropine 0.1 mg Route: IVP; Site: right forearm;                                  bp  

15:13 Follow up: Response: No adverse reaction                                                bp  

14:00 Drug: NS 0.9% (30 ml/kg) 30 ml/kg Route: IV; Rate: bolus; Site: left antecubital;       hj  

18:46 Follow up: IV Status: Completed infusion; IV Intake: 1769ml                             bp  

14:30 Drug: Albuterol - atroVENT (3:1) (2.5 mg - 0.5 mg) 3 ml Route: Nebulizer;               bp  

15:17 Follow up: Response: No adverse reaction                                                bp  

14:30 Drug: NARcan 0.4 mg Route: IVP; Site: right antecubital;                                bp  

16:01 Follow up: Response: Marked relief of symptoms                                          bp  

15:00 Drug: Ativan 2 mg Route: IVP; Site: right antecubital;                                  bp  

16:01 Follow up: Response: Marked relief of symptoms                                          bp  

15:43 Drug: vancoMYCIN 1 grams Route: IVPB; Infused Over: 2 hrs; Site: right antecubital;     bp  

16:05 Drug: Lovenox 80 mg Route: Sub-Q; Site: right lower abdomen;                            bp  

16:17 Follow up: Response: No adverse reaction                                                bp  

16:12 Not Given (pt not tolerating po): Aspirin Chewable Tablet 324 mg PO once; 81 mg tablets bp  

      x 4                                                                                         

17:00 Drug: Etomidate 20 mg Route: IVP; Site: left forearm;                                   hj  

17:19 Follow up: Response: No adverse reaction                                                bp  

17:00 Drug: Succinylcholine 120 mg Route: IVP; Site: left forearm;                            hj  

17:32 Follow up: Response: No adverse reaction                                                bp  

17:45 Drug: Propofol 5 mcg/kg/min Route: IV; Rate: calculated rate; Site: right antecubital;  bp  

                                                                                                  

                                                                                                  

Intake:                                                                                           

18:46 IV: 1769ml; Total: 1769ml.                                                              bp  

                                                                                                  

Outcome:                                                                                          

15:30 Decision to Hospitalize by Provider.                                                    ma2 

20:41 Admitted to ICU accompanied by nurse, via stretcher, room 2, with oxygen, on monitor,   ea  

      with chart, Other Pt remains intubated, respiratory at bedside for assistance.  Report      

      called to  Receiving nurse in ICU                                                           

20:41 Condition: stable                                                                       ea  

21:30 Patient left the ED.                                                                    ea  

                                                                                                  

Signatures:                                                                                       

Dispatcher [employer]                           EDMS                                                 

Fransisco Peoples Erika, RN                        RN   ed1                                                  

Nancy Rodgers Ana ag Warren, Shannon sw Joaquin, Henry, RN RN hj Antunez, Elena, RN RN ea Peltier, Brian, RN RN bp Alzahri, Mohammad, MD MD   ma2                                                  

                                                                                                  

**************************************************************************************************

## 2019-03-13 LAB
BUN BLD-MCNC: 95 MG/DL (ref 7–18)
COHGB MFR BLDA: 1.8 % (ref 0–1.5)
GLUCOSE SERPLBLD-MCNC: 64 MG/DL (ref 74–106)
HCT VFR BLD CALC: 28.1 % (ref 36–45)
LYMPHOCYTES # SPEC AUTO: 0.8 K/UL (ref 0.7–4.9)
MAGNESIUM SERPL-MCNC: 2.7 MG/DL (ref 1.8–2.4)
OXYHGB MFR BLDA: 95.5 % (ref 94–97)
PMV BLD: 9.6 FL (ref 7.6–11.3)
POTASSIUM SERPL-SCNC: 4.7 MMOL/L (ref 3.5–5.1)
RBC # BLD: 3.01 M/UL (ref 3.86–4.86)
SAO2 % BLDA: 98 % (ref 92–98.5)

## 2019-03-13 RX ADMIN — MIDAZOLAM PRN MG: 1 INJECTION INTRAMUSCULAR; INTRAVENOUS at 13:08

## 2019-03-13 RX ADMIN — DEXTROSE AND SODIUM CHLORIDE SCH: 5; .45 INJECTION, SOLUTION INTRAVENOUS at 12:55

## 2019-03-13 RX ADMIN — Medication SCH ML: at 09:12

## 2019-03-13 RX ADMIN — ENOXAPARIN SODIUM SCH MG: 30 INJECTION SUBCUTANEOUS at 19:55

## 2019-03-13 RX ADMIN — CEFTRIAXONE SCH MLS: 1 INJECTION, SOLUTION INTRAVENOUS at 09:11

## 2019-03-13 RX ADMIN — Medication SCH ML: at 19:56

## 2019-03-13 RX ADMIN — ALBUTEROL SULFATE PRN MG: 2.5 SOLUTION RESPIRATORY (INHALATION) at 17:46

## 2019-03-13 RX ADMIN — SODIUM CHLORIDE SCH MLS: 0.9 INJECTION, SOLUTION INTRAVENOUS at 09:11

## 2019-03-13 RX ADMIN — IPRATROPIUM BROMIDE PRN MG: 0.5 SOLUTION RESPIRATORY (INHALATION) at 14:40

## 2019-03-13 RX ADMIN — DEXTROSE AND SODIUM CHLORIDE SCH MLS: 5; .45 INJECTION, SOLUTION INTRAVENOUS at 22:30

## 2019-03-13 RX ADMIN — FENTANYL CITRATE PRN MCG: 50 INJECTION, SOLUTION INTRAMUSCULAR; INTRAVENOUS at 20:31

## 2019-03-13 RX ADMIN — ALBUTEROL SULFATE PRN MG: 2.5 SOLUTION RESPIRATORY (INHALATION) at 14:40

## 2019-03-13 RX ADMIN — IPRATROPIUM BROMIDE PRN MG: 0.5 SOLUTION RESPIRATORY (INHALATION) at 17:46

## 2019-03-13 RX ADMIN — DEXTROSE AND SODIUM CHLORIDE SCH MLS: 5; .45 INJECTION, SOLUTION INTRAVENOUS at 00:15

## 2019-03-13 RX ADMIN — CEFTRIAXONE SCH MLS: 1 INJECTION, SOLUTION INTRAVENOUS at 00:15

## 2019-03-13 RX ADMIN — CEFTRIAXONE SCH MLS: 1 INJECTION, SOLUTION INTRAVENOUS at 19:55

## 2019-03-13 NOTE — HP
Date of Admission:  03/12/2019



History Of Present Illness:  A 68-year-old female with multiple medical problems including liver mares
splant status from hepatitis C back in 1999.  She was in a nursing home and was brought by [facility] from 
the nursing home.  The history from the emergency room physician and the nurse who received the patie
nt from the [facility] people and with the nursing home papers is that the patient has pain for about 2-3 d
ays, looked disoriented and feeling weak, and her blood pressure was not controlled.  The patient was
 noted in the emergency room to have decreased responsiveness.  The patient on alprazolam and it was 
thought that the patient may have been overmedicated on that medicine.  She was given Narcan and as p
er the ER the patient's mental status was awake and she was more alert.  However with her ABGs that w
ere drawn at 2 L nasal prong, the patient showed acute respiratory failure with hypoxia and hypercapn
ia and so she was intubated.  At this point, the patient is intubated on mechanical ventilation and s
he is sedated.  No more history or review of systems could be taken.



Past Medical History:  

1.As above liver transplant status.

2.Congestive heart failure.

3.Coronary artery disease.

4.Hypothyroidism.

5.Hypertension.

6.COPD from history of heavy smoking in the past.

7.Anxiety, for which the patient takes alprazolam.

8.Chronic renal insufficiency.



Social History:  Stopped smoking, used to be heavy smoker.  No alcohol or IV drug abuse history.



Family History:  Noncontributing.



Medications:  Include alprazolam 0.5 mg p.o. b.i.d., aspirin 81 mg p.o. daily, ascorbic acid (vitamin
 C), budesonide inhalation b.i.d., folic acid 1 mg daily, furosemide 40 mg p.o. daily, gabapentin 400
 mg p.o. q.6 hours, levothyroxine 125 mcg p.o. daily, metoprolol 25 mg p.o. b.i.d.



Allergies:  AT THIS TIME, NO KNOWN DRUG ALLERGIES.



Physical Examination:

General:  The patient is intubated and sedated. 

Vital Signs: Blood pressure 110/65, pulse 50, temperature 97.1. 

Heart:  Regular rate and rhythm. Chest:  Bilateral crackles. 

Abdomen:  Soft, benign, nontender.  Bowel sounds are active. 

Extremities:  No edema.  No cyanosis.  Peripheral pulses are felt. 

Neurological: The patient is sedated, however, she withdraws her extremities.



Imaging/laboratory Data:  Chest x-ray showed large left pleural effusion with atelectasis and also in
filtrate and atelectasis in the medial right lung base, masslike density in the right suprahilar balbir
on, or pneumonia can have the same appearance.  Followup CT is recommended. 



Head CT:  No acute changes. Left mastoiditis suspected.



Laboratory Data:  White cell count 13.2, hemoglobin 9.8, hematocrit 30.9, platelets 364.  ABGs as men
tioned.  The patient with acute respiratory failure with hypercapnia.  Chemistry, still pending.  Pro
lactin 0.90.  TSH 2.87.  PT 14.9, INR 1.27.



Assessment And Plan:  Acute respiratory failure with hypercapnia.  The patient intubated.  Most likel
y secondary to pneumonia but possible masslike lesion.  However, the patient is being admitted to the
 ICU.  We will put her on Rocephin and vancomycin IV antibiotics and breathing treatments with albute
rol and ipratropium.  She was also put on azithromycin.  We will do mechanical ventilation support.  
We will put her on IV fluid gentle hydration with D5 half-normal at 50 cc an hour.  Blood cultures ar
e pending.  We will follow electrolytes also and chemistry and we will manage mechanical ventilation.
  Look orders for details.





MFS/MODL

DD:  03/12/2019 17:30:19Voice ID:  [id number]

## 2019-03-13 NOTE — RAD REPORT
EXAM DESCRIPTION:  RAD - Chest Single View - 3/13/2019 5:32 am

 

CLINICAL HISTORY:  ventilator

Chest pain.

 

COMPARISON:  Chest Single View dated 3/12/2019; Chest Single View dated 3/12/2019; Chest Single View 
dated 4/5/2016

 

FINDINGS:  Portable technique limits examination quality.

 

Tip of the endotracheal tube is above claire. Enteric tube descends into the upper abdomen. Little ov
erall change is seen in the aeration of the lungs. Right suprahilar density remains unchanged. Heart 
size is mildly enlarged. Aortic atherosclerosis. Mildly impacted proximal left humerus fracture is st
able.

 

IMPRESSION:  Stable chest since 03/12/2019 study.

## 2019-03-13 NOTE — PN
Subjective:  Patient is still intubated.  However, she is more awake because we went down on her douglas
tion.



Objective:  Vital Signs:  Blood pressure 120/50, pulse 65, temperature 98.7. 

Heart:  Regular rate and rhythm. 

Chest:  Crackles. 

Abdomen:  Soft, benign.  Bowel sounds are active. 

Extremities:  No edema.  No cyanosis.  Peripheral pulses are felt. 

Neurological:  Patient is alert however she is still under the effect of sedation and with mechanical
 ventilation.



Laboratory Data:  ABGs on SIMV with oxygen 35%, SIMV 14.  She is breathing 16 breaths per minute.  He
r pH is 7.42, pCO2 38.6, PO2 is 98.1, saturation 98%.  White cell count 14.5, hemoglobin 8.7, hematoc
rit 28.1, platelets 259, neutrophils 83.2, calcium 6.7, BUN 95, creatinine 2.5.



Assessment And Plan:  

1.Pneumonia, bilateral, on IV antibiotics.  We will continue those.

2.Hypocalcemia.  We will give her calcium gluconate ampules IV.  We will monitor that.

3.Acute renal failure, likely secondary to sepsis and volume depletion.  We will continue gentle hyd
ration.  We will monitor that.

4.Acute respiratory failure, on mechanical ventilation.  We will keep the patient 1 more day on the 
above mechanical ventilation settings, we will try to wean off tomorrow.  Look orders for details.





MFS/MODL

DD:  03/13/2019 12:31:08Voice ID:  [id number]

DT:  03/13/2019 14:39:58Report ID:  [id number]

## 2019-03-14 LAB
BLD SMEAR INTERP: (no result)
BUN BLD-MCNC: 93 MG/DL (ref 7–18)
COHGB MFR BLDA: 1.9 % (ref 0–1.5)
GLUCOSE SERPLBLD-MCNC: 99 MG/DL (ref 74–106)
HCT VFR BLD CALC: 33.2 % (ref 36–45)
LYMPHOCYTES # SPEC AUTO: 1.1 K/UL (ref 0.7–4.9)
MAGNESIUM SERPL-MCNC: 2.6 MG/DL (ref 1.8–2.4)
MORPHOLOGY BLD-IMP: (no result)
OXYHGB MFR BLDA: 83.4 % (ref 94–97)
PMV BLD: 9.6 FL (ref 7.6–11.3)
POTASSIUM SERPL-SCNC: 3.8 MMOL/L (ref 3.5–5.1)
RBC # BLD: 3.6 M/UL (ref 3.86–4.86)
SAO2 % BLDA: 85.6 % (ref 92–98.5)

## 2019-03-14 RX ADMIN — ALBUTEROL SULFATE PRN MG: 2.5 SOLUTION RESPIRATORY (INHALATION) at 08:03

## 2019-03-14 RX ADMIN — SODIUM CHLORIDE SCH MLS: 0.9 INJECTION, SOLUTION INTRAVENOUS at 16:00

## 2019-03-14 RX ADMIN — ASPIRIN 81 MG SCH MG: 81 TABLET ORAL at 09:53

## 2019-03-14 RX ADMIN — Medication SCH ML: at 09:55

## 2019-03-14 RX ADMIN — ALBUTEROL SULFATE PRN MG: 2.5 SOLUTION RESPIRATORY (INHALATION) at 14:26

## 2019-03-14 RX ADMIN — FENTANYL CITRATE PRN MCG: 50 INJECTION, SOLUTION INTRAMUSCULAR; INTRAVENOUS at 11:43

## 2019-03-14 RX ADMIN — SODIUM CHLORIDE SCH MLS: 0.9 INJECTION, SOLUTION INTRAVENOUS at 09:54

## 2019-03-14 RX ADMIN — MAGNESIUM OXIDE TAB 400 MG (241.3 MG ELEMENTAL MG) SCH MG: 400 (241.3 MG) TAB at 09:53

## 2019-03-14 RX ADMIN — IPRATROPIUM BROMIDE PRN MG: 0.5 SOLUTION RESPIRATORY (INHALATION) at 08:03

## 2019-03-14 RX ADMIN — LEVOTHYROXINE SODIUM SCH MG: 125 TABLET ORAL at 05:53

## 2019-03-14 RX ADMIN — ENOXAPARIN SODIUM SCH MG: 30 INJECTION SUBCUTANEOUS at 16:00

## 2019-03-14 RX ADMIN — CEFTRIAXONE SCH MLS: 1 INJECTION, SOLUTION INTRAVENOUS at 20:05

## 2019-03-14 RX ADMIN — Medication SCH ML: at 20:05

## 2019-03-14 RX ADMIN — MIDAZOLAM PRN MG: 1 INJECTION INTRAMUSCULAR; INTRAVENOUS at 02:51

## 2019-03-14 RX ADMIN — CEFTRIAXONE SCH MLS: 1 INJECTION, SOLUTION INTRAVENOUS at 09:53

## 2019-03-14 RX ADMIN — FENTANYL CITRATE PRN MCG: 50 INJECTION, SOLUTION INTRAMUSCULAR; INTRAVENOUS at 03:51

## 2019-03-14 NOTE — EKG
Test Date:    2019-03-12               Test Time:    13:04:19

Technician:   ERIC                                     

                                                     

MEASUREMENT RESULTS:                                       

Intervals:                                           

Rate:         49                                     

MO:           200                                    

QRSD:         160                                    

QT:           566                                    

QTc:          511                                    

Axis:                                                

P:            31                                     

MO:           200                                    

QRS:          -48                                    

T:            -2                                     

                                                     

INTERPRETIVE STATEMENTS:                                       

                                                     

Sinus bradycardia

Left axis deviation

Left bundle branch block

Abnormal ECG

Compared to ECG 04/05/2016 17:46:26

Sinus rhythm no longer present



Electronically Signed On 03-13-19 08:12:14 CDT by Isaac Amaya

## 2019-03-14 NOTE — PN
Subjective:  The patient is intubated.  However, she looks better.



Objective:  Vital Signs:  Blood pressure 130/55, pulse 80, temperature 98.7. 

Heart:  Regular rate and rhythm. 

Chest:  Bilateral crackles. 

Abdomen:  Soft, benign.  Bowel sounds are active. 

Extremities:  No edema.  No cyanosis.  Peripheral pulses are felt. 

Neurological examination:  The patient is alert, moves all extremities, intubated.



Laboratory Data:  ABGs on mechanical ventilation 35% O2, pH 7.41, pCO2 40.3, PO2 53.4, saturation 85.
6. 



White cell count 15.9, hemoglobin 10, hematocrit 33.2, platelets 242.  Sodium 135, BUN 93, creatinine
 2.69, calcium 7.0.



Assessment And Plan:  

1.Acute respiratory failure.  The patient is improving.  However, we will keep her one more day befo
re start weaning her off, pending improved saturation.

2.Pneumonia.  Blood cultures, no growth to date.  Urine showed yeast 4+.

3.Acute renal failure, likely secondary to dehydration.  We will change her IV fluids to normal sali
ne at 75 cc an hour.

4.Anemia of chronic illness.  Clinically, stable.

5.Look orders for details.





MFS/MODL

DD:  03/14/2019 15:54:28Voice ID:  [id number]

DT:  03/14/2019 21:07:06Report ID:  [id number]

## 2019-03-14 NOTE — RAD REPORT
EXAM DESCRIPTION:  [facility] Single View3/14/2019 6:47 am

 

CLINICAL HISTORY:  Shortness of breath

 

COMPARISON:  March 13th

 

FINDINGS:  Small to moderate left pleural effusion is suspected

 

Small right pleural effusion present.

 

No change in a right suprahilar opacity.

 

Tubes remain in place

## 2019-03-15 LAB
BUN BLD-MCNC: 87 MG/DL (ref 7–18)
COHGB MFR BLDA: 1.8 % (ref 0–1.5)
GLUCOSE SERPLBLD-MCNC: 90 MG/DL (ref 74–106)
HCT VFR BLD CALC: 29.8 % (ref 36–45)
LYMPHOCYTES # SPEC AUTO: 0.6 K/UL (ref 0.7–4.9)
MAGNESIUM SERPL-MCNC: 2.7 MG/DL (ref 1.8–2.4)
MORPHOLOGY BLD-IMP: (no result)
OXYHGB MFR BLDA: 85.3 % (ref 94–97)
PMV BLD: 9.7 FL (ref 7.6–11.3)
POTASSIUM SERPL-SCNC: 4.3 MMOL/L (ref 3.5–5.1)
RBC # BLD: 3.24 M/UL (ref 3.86–4.86)
SAO2 % BLDA: 87.6 % (ref 92–98.5)

## 2019-03-15 PROCEDURE — 3E0G76Z INTRODUCTION OF NUTRITIONAL SUBSTANCE INTO UPPER GI, VIA NATURAL OR ARTIFICIAL OPENING: ICD-10-PCS

## 2019-03-15 RX ADMIN — IPRATROPIUM BROMIDE PRN MG: 0.5 SOLUTION RESPIRATORY (INHALATION) at 19:57

## 2019-03-15 RX ADMIN — MAGNESIUM OXIDE TAB 400 MG (241.3 MG ELEMENTAL MG) SCH MG: 400 (241.3 MG) TAB at 08:53

## 2019-03-15 RX ADMIN — SODIUM CHLORIDE SCH MLS: 0.45 INJECTION, SOLUTION INTRAVENOUS at 14:32

## 2019-03-15 RX ADMIN — Medication SCH ML: at 08:53

## 2019-03-15 RX ADMIN — METHYLPREDNISOLONE SODIUM SUCCINATE SCH MG: 40 INJECTION, POWDER, FOR SOLUTION INTRAMUSCULAR; INTRAVENOUS at 16:30

## 2019-03-15 RX ADMIN — SODIUM CHLORIDE SCH MLS: 0.9 INJECTION, SOLUTION INTRAVENOUS at 04:53

## 2019-03-15 RX ADMIN — IPRATROPIUM BROMIDE PRN MG: 0.5 SOLUTION RESPIRATORY (INHALATION) at 23:15

## 2019-03-15 RX ADMIN — CEFTRIAXONE SCH MLS: 1 INJECTION, SOLUTION INTRAVENOUS at 20:02

## 2019-03-15 RX ADMIN — ALBUTEROL SULFATE PRN MG: 2.5 SOLUTION RESPIRATORY (INHALATION) at 16:00

## 2019-03-15 RX ADMIN — ALBUTEROL SULFATE PRN MG: 2.5 SOLUTION RESPIRATORY (INHALATION) at 19:57

## 2019-03-15 RX ADMIN — SODIUM CHLORIDE SCH MLS: 0.9 INJECTION, SOLUTION INTRAVENOUS at 10:00

## 2019-03-15 RX ADMIN — IPRATROPIUM BROMIDE PRN MG: 0.5 SOLUTION RESPIRATORY (INHALATION) at 16:00

## 2019-03-15 RX ADMIN — Medication SCH ML: at 20:08

## 2019-03-15 RX ADMIN — ENOXAPARIN SODIUM SCH MG: 30 INJECTION SUBCUTANEOUS at 16:31

## 2019-03-15 RX ADMIN — IPRATROPIUM BROMIDE PRN MG: 0.5 SOLUTION RESPIRATORY (INHALATION) at 12:02

## 2019-03-15 RX ADMIN — ALBUTEROL SULFATE PRN MG: 2.5 SOLUTION RESPIRATORY (INHALATION) at 23:15

## 2019-03-15 RX ADMIN — IPRATROPIUM BROMIDE PRN MG: 0.5 SOLUTION RESPIRATORY (INHALATION) at 08:09

## 2019-03-15 RX ADMIN — CEFTRIAXONE SCH MLS: 1 INJECTION, SOLUTION INTRAVENOUS at 08:53

## 2019-03-15 RX ADMIN — ALBUTEROL SULFATE PRN MG: 2.5 SOLUTION RESPIRATORY (INHALATION) at 12:02

## 2019-03-15 RX ADMIN — ALBUTEROL SULFATE PRN MG: 2.5 SOLUTION RESPIRATORY (INHALATION) at 08:09

## 2019-03-15 RX ADMIN — VANCOMYCIN HYDROCHLORIDE SCH MLS: 1 INJECTION, POWDER, FOR SOLUTION INTRAVENOUS at 13:06

## 2019-03-15 RX ADMIN — ASPIRIN 81 MG SCH MG: 81 TABLET ORAL at 08:53

## 2019-03-15 RX ADMIN — LEVOTHYROXINE SODIUM SCH MG: 125 TABLET ORAL at 05:43

## 2019-03-15 NOTE — PN
Subjective:  The patient is alert and awake, on mechanical ventilation.



Objective:  Vital Signs:  Blood pressure 140/55, pulse 87, temperature 97. 

Heart:  Regular rate and rhythm. 

Chest:  Bilateral crackles. 

Abdomen:  Soft, benign.  Bowel sounds are active. 

Extremities:  No edema.  No cyanosis.  Peripheral pulses are felt. 

Neurological:  Patient is alert, on mechanical ventilation.  Moves all her extremities.



Laboratory Data:  Chest x-ray showed bilateral pulmonary opacities, worse than before, could be pneum
onia versus pulmonary edema.  Sodium 140, potassium 4.3, BUN 87, creatinine 2.44, calcium 7.  ABGs on
 mechanical ventilation; SIMV 14, inspired oxygen 35, pH 7.30, pCO2 59.6, PO2 63.4, O2 saturation 87.
6%.



Assessment And Plan:  

1.Acute respiratory failure with pneumonia, probably worsening versus pulmonary edema secondary to p
lacing the patient on normal saline.  We are going to go ahead and give her 1 dose of Lasix and cid
e her IV fluids to half-normal saline at 60 cc an hour.  Then I will go ahead also and start feeding,
 feeding through the NG tube.  Continue the patient's current antibiotics for her pneumonia.

2.Type 2 diabetes.  Fingersticks blood sugars noted.  

3.We are going to also for the patient add IV Solu-Medrol and we will then add antibiotics, vancomyc
in.  We will monitor her electrolytes and ABGs.  Hopefully can wean off the patient in 1 or 2 days af
ter mechanical ventilation.

4.Acute renal failure, slightly better with hydration.  We will keep monitoring that.  Look orders f
or details.





MFS/MODL

DD:  03/15/2019 13:16:33Voice ID:  [id number]

DT:  03/15/2019 17:24:39Report ID:  [id number]

## 2019-03-15 NOTE — RAD REPORT
EXAM DESCRIPTION:  [facility] Single View3/15/2019 11:40 am

 

CLINICAL HISTORY:  Shortness of breath

 

COMPARISON:  none

 

FINDINGS:   Worsening in diffuse bilateral pulmonary opacities right greater than left.

 

Bilateral pleural effusions. The heart is normal size

 

Endotracheal and nasogastric tubes remain in place

 

IMPRESSION:  Worsening in bilateral pulmonary opacities which could represent pneumonia or pulmonary 
edema

## 2019-03-16 LAB
BUN BLD-MCNC: 83 MG/DL (ref 7–18)
COHGB MFR BLDA: 1.4 % (ref 0–1.5)
COHGB MFR BLDA: 1.8 % (ref 0–1.5)
GLUCOSE SERPLBLD-MCNC: 286 MG/DL (ref 74–106)
HCT VFR BLD CALC: 28.1 % (ref 36–45)
LYMPHOCYTES # SPEC AUTO: 0.1 K/UL (ref 0.7–4.9)
MAGNESIUM SERPL-MCNC: 2.6 MG/DL (ref 1.8–2.4)
OXYHGB MFR BLDA: 81.1 % (ref 94–97)
OXYHGB MFR BLDA: 93 % (ref 94–97)
PMV BLD: 9.5 FL (ref 7.6–11.3)
POTASSIUM SERPL-SCNC: 4.3 MMOL/L (ref 3.5–5.1)
RBC # BLD: 3.01 M/UL (ref 3.86–4.86)
SAO2 % BLDA: 82.6 % (ref 92–98.5)
SAO2 % BLDA: 95.2 % (ref 92–98.5)

## 2019-03-16 RX ADMIN — CEFTRIAXONE SCH MLS: 1 INJECTION, SOLUTION INTRAVENOUS at 21:13

## 2019-03-16 RX ADMIN — SODIUM CHLORIDE SCH MLS: 0.9 INJECTION, SOLUTION INTRAVENOUS at 08:57

## 2019-03-16 RX ADMIN — IPRATROPIUM BROMIDE PRN MG: 0.5 SOLUTION RESPIRATORY (INHALATION) at 15:31

## 2019-03-16 RX ADMIN — Medication SCH ML: at 08:58

## 2019-03-16 RX ADMIN — ALBUTEROL SULFATE PRN MG: 2.5 SOLUTION RESPIRATORY (INHALATION) at 15:30

## 2019-03-16 RX ADMIN — IPRATROPIUM BROMIDE PRN MG: 0.5 SOLUTION RESPIRATORY (INHALATION) at 07:43

## 2019-03-16 RX ADMIN — METHYLPREDNISOLONE SODIUM SUCCINATE SCH MG: 40 INJECTION, POWDER, FOR SOLUTION INTRAMUSCULAR; INTRAVENOUS at 16:33

## 2019-03-16 RX ADMIN — METHYLPREDNISOLONE SODIUM SUCCINATE SCH MG: 40 INJECTION, POWDER, FOR SOLUTION INTRAMUSCULAR; INTRAVENOUS at 08:57

## 2019-03-16 RX ADMIN — HUMAN INSULIN SCH UNIT: 100 INJECTION, SOLUTION SUBCUTANEOUS at 21:12

## 2019-03-16 RX ADMIN — SODIUM CHLORIDE SCH: 0.45 INJECTION, SOLUTION INTRAVENOUS at 05:59

## 2019-03-16 RX ADMIN — LEVOTHYROXINE SODIUM SCH MG: 125 TABLET ORAL at 05:49

## 2019-03-16 RX ADMIN — ALBUTEROL SULFATE PRN MG: 2.5 SOLUTION RESPIRATORY (INHALATION) at 07:42

## 2019-03-16 RX ADMIN — MAGNESIUM OXIDE TAB 400 MG (241.3 MG ELEMENTAL MG) SCH MG: 400 (241.3 MG) TAB at 08:57

## 2019-03-16 RX ADMIN — CEFTRIAXONE SCH MLS: 1 INJECTION, SOLUTION INTRAVENOUS at 08:57

## 2019-03-16 RX ADMIN — ALBUTEROL SULFATE PRN MG: 2.5 SOLUTION RESPIRATORY (INHALATION) at 23:20

## 2019-03-16 RX ADMIN — IPRATROPIUM BROMIDE PRN MG: 0.5 SOLUTION RESPIRATORY (INHALATION) at 19:35

## 2019-03-16 RX ADMIN — METHYLPREDNISOLONE SODIUM SUCCINATE SCH MG: 40 INJECTION, POWDER, FOR SOLUTION INTRAMUSCULAR; INTRAVENOUS at 00:16

## 2019-03-16 RX ADMIN — ENOXAPARIN SODIUM SCH MG: 30 INJECTION SUBCUTANEOUS at 16:33

## 2019-03-16 RX ADMIN — Medication SCH ML: at 20:00

## 2019-03-16 RX ADMIN — ASPIRIN 81 MG SCH MG: 81 TABLET ORAL at 08:59

## 2019-03-16 RX ADMIN — HUMAN INSULIN SCH UNIT: 100 INJECTION, SOLUTION SUBCUTANEOUS at 16:33

## 2019-03-16 RX ADMIN — ALBUTEROL SULFATE PRN MG: 2.5 SOLUTION RESPIRATORY (INHALATION) at 19:35

## 2019-03-16 RX ADMIN — FENTANYL CITRATE PRN MCG: 50 INJECTION, SOLUTION INTRAMUSCULAR; INTRAVENOUS at 12:59

## 2019-03-16 RX ADMIN — ALBUTEROL SULFATE PRN MG: 2.5 SOLUTION RESPIRATORY (INHALATION) at 12:20

## 2019-03-16 RX ADMIN — IPRATROPIUM BROMIDE PRN MG: 0.5 SOLUTION RESPIRATORY (INHALATION) at 12:20

## 2019-03-16 RX ADMIN — Medication SCH ML: at 21:13

## 2019-03-16 RX ADMIN — FENTANYL CITRATE PRN MCG: 50 INJECTION, SOLUTION INTRAMUSCULAR; INTRAVENOUS at 00:16

## 2019-03-16 RX ADMIN — IPRATROPIUM BROMIDE PRN MG: 0.5 SOLUTION RESPIRATORY (INHALATION) at 23:20

## 2019-03-16 NOTE — RAD REPORT
EXAM DESCRIPTION:  RAD - Chest Single View - 3/16/2019 7:01 am

 

CLINICAL HISTORY:  vented

Chest pain.

 

COMPARISON:  Chest Single View dated 3/15/2019; Chest Single View dated 3/14/2019; Chest Single View 
dated 3/13/2019; Chest Single View dated 3/12/2019

 

FINDINGS:  Portable technique limits examination quality.

 

The tip of the ET tube appears to be in the right mainstem bronchus. About 2-3 cm of retraction would
 be suggested for better placement. Enteric tube descends into the upper abdomen. Bilateral pulmonary
 opacities are again noted, greater on the right, without significant change. Mild improvement in the
 size of the left pleural effusion noted.

 

IMPRESSION:  ET tube tip appears to be in the right mainstem bronchus as detailed. Consider tamara dias.

## 2019-03-16 NOTE — PN
Subjective:  The patient is restful, on mechanical ventilation still.



Objective:  Vital Signs:  Blood pressure 150/75, pulse 86, temperature 98.2. 

Heart:  Regular rate and rhythm. 

Chest:  Bilateral crackles. 

Abdomen:  Soft.  Benign. 

Neurologic:  Alert, oriented, grossly intact. 

Extremities:  No edema.  No cyanosis.  Peripheral pulses are felt.



Laboratory Data:  ABGs on mechanical ventilation, SIMV rate of 14, inspired oxygen 45%, pH 7.39, pCO2
 45, PO2 78.1, saturation 95.2.  Chemistry, BUN 83, creatinine 2.29, calcium 7.8.  CBC; white cell co
unt dropped to 11.3, hemoglobin 9.2, hematocrit 28.1, platelets 279.



Assessment And Plan:  

1.The patient had acute respiratory failure on mechanical ventilation, is doing much better today.  
We will try to wean her off and see if she seems also comfortable.

2.Pneumonia.  We will continue the patient on current antibiotics including vancomycin, and she seem
s to be responding to that.

3.Acute renal failure, improving.

4.The patient is getting to be more clinically stable.  We will continue current treatment.  We will
 try to wean her off.  Look orders for details.





MFS/MODL

DD:  03/16/2019 11:52:15Voice ID:  [id number]

DT:  03/16/2019 16:26:21Report ID:  [id number]

## 2019-03-17 LAB
BUN BLD-MCNC: 92 MG/DL (ref 7–18)
COHGB MFR BLDA: 1.2 % (ref 0–1.5)
GLUCOSE SERPLBLD-MCNC: 200 MG/DL (ref 74–106)
MAGNESIUM SERPL-MCNC: 2.7 MG/DL (ref 1.8–2.4)
OXYHGB MFR BLDA: 95.4 % (ref 94–97)
POTASSIUM SERPL-SCNC: 4.8 MMOL/L (ref 3.5–5.1)
SAO2 % BLDA: 97.4 % (ref 92–98.5)

## 2019-03-17 RX ADMIN — ALBUTEROL SULFATE PRN MG: 2.5 SOLUTION RESPIRATORY (INHALATION) at 03:30

## 2019-03-17 RX ADMIN — CEFTRIAXONE SCH MLS: 1 INJECTION, SOLUTION INTRAVENOUS at 20:23

## 2019-03-17 RX ADMIN — ALBUTEROL SULFATE PRN MG: 2.5 SOLUTION RESPIRATORY (INHALATION) at 08:19

## 2019-03-17 RX ADMIN — ASPIRIN 81 MG SCH MG: 81 TABLET ORAL at 09:12

## 2019-03-17 RX ADMIN — LEVOTHYROXINE SODIUM SCH MG: 125 TABLET ORAL at 06:03

## 2019-03-17 RX ADMIN — VANCOMYCIN HYDROCHLORIDE SCH MLS: 1 INJECTION, POWDER, FOR SOLUTION INTRAVENOUS at 11:51

## 2019-03-17 RX ADMIN — IPRATROPIUM BROMIDE PRN MG: 0.5 SOLUTION RESPIRATORY (INHALATION) at 23:31

## 2019-03-17 RX ADMIN — ALBUTEROL SULFATE PRN MG: 2.5 SOLUTION RESPIRATORY (INHALATION) at 16:43

## 2019-03-17 RX ADMIN — ALBUTEROL SULFATE PRN MG: 2.5 SOLUTION RESPIRATORY (INHALATION) at 20:08

## 2019-03-17 RX ADMIN — MAGNESIUM OXIDE TAB 400 MG (241.3 MG ELEMENTAL MG) SCH MG: 400 (241.3 MG) TAB at 09:12

## 2019-03-17 RX ADMIN — ENOXAPARIN SODIUM SCH MG: 30 INJECTION SUBCUTANEOUS at 17:49

## 2019-03-17 RX ADMIN — HUMAN INSULIN SCH UNIT: 100 INJECTION, SOLUTION SUBCUTANEOUS at 17:49

## 2019-03-17 RX ADMIN — IPRATROPIUM BROMIDE PRN MG: 0.5 SOLUTION RESPIRATORY (INHALATION) at 16:43

## 2019-03-17 RX ADMIN — METHYLPREDNISOLONE SODIUM SUCCINATE SCH MG: 40 INJECTION, POWDER, FOR SOLUTION INTRAMUSCULAR; INTRAVENOUS at 17:49

## 2019-03-17 RX ADMIN — METHYLPREDNISOLONE SODIUM SUCCINATE SCH MG: 40 INJECTION, POWDER, FOR SOLUTION INTRAMUSCULAR; INTRAVENOUS at 09:13

## 2019-03-17 RX ADMIN — CEFTRIAXONE SCH MLS: 1 INJECTION, SOLUTION INTRAVENOUS at 09:12

## 2019-03-17 RX ADMIN — HUMAN INSULIN SCH UNIT: 100 INJECTION, SOLUTION SUBCUTANEOUS at 02:35

## 2019-03-17 RX ADMIN — ALBUTEROL SULFATE PRN MG: 2.5 SOLUTION RESPIRATORY (INHALATION) at 23:31

## 2019-03-17 RX ADMIN — SODIUM CHLORIDE SCH MLS: 0.9 INJECTION, SOLUTION INTRAVENOUS at 09:13

## 2019-03-17 RX ADMIN — FENTANYL CITRATE PRN MCG: 50 INJECTION, SOLUTION INTRAMUSCULAR; INTRAVENOUS at 00:46

## 2019-03-17 RX ADMIN — FENTANYL CITRATE PRN MCG: 50 INJECTION, SOLUTION INTRAMUSCULAR; INTRAVENOUS at 23:02

## 2019-03-17 RX ADMIN — METHYLPREDNISOLONE SODIUM SUCCINATE SCH MG: 40 INJECTION, POWDER, FOR SOLUTION INTRAMUSCULAR; INTRAVENOUS at 00:51

## 2019-03-17 RX ADMIN — SODIUM CHLORIDE SCH MLS: 0.45 INJECTION, SOLUTION INTRAVENOUS at 02:34

## 2019-03-17 RX ADMIN — HUMAN INSULIN SCH UNIT: 100 INJECTION, SOLUTION SUBCUTANEOUS at 11:51

## 2019-03-17 RX ADMIN — IPRATROPIUM BROMIDE PRN MG: 0.5 SOLUTION RESPIRATORY (INHALATION) at 08:19

## 2019-03-17 RX ADMIN — Medication SCH ML: at 20:23

## 2019-03-17 RX ADMIN — IPRATROPIUM BROMIDE PRN MG: 0.5 SOLUTION RESPIRATORY (INHALATION) at 11:29

## 2019-03-17 RX ADMIN — IPRATROPIUM BROMIDE PRN MG: 0.5 SOLUTION RESPIRATORY (INHALATION) at 03:30

## 2019-03-17 RX ADMIN — SODIUM CHLORIDE SCH: 0.45 INJECTION, SOLUTION INTRAVENOUS at 16:00

## 2019-03-17 RX ADMIN — Medication SCH ML: at 09:14

## 2019-03-17 RX ADMIN — IPRATROPIUM BROMIDE PRN MG: 0.5 SOLUTION RESPIRATORY (INHALATION) at 20:08

## 2019-03-17 NOTE — RAD REPORT
EXAM DESCRIPTION:  [facility] Single View3/17/2019 6:09 am

 

CLINICAL HISTORY:  Chest pain

 

COMPARISON:  none

 

FINDINGS:  Endotracheal tube has been retracted with its tip 4.5 centimeters above the claire.

 

Overall, no significant change in diffuse bilateral pulmonary opacities right greater than left. Pleu
ral effusions are unchanged.

 

Nasogastric tube is present within the stomach.

 

IMPRESSION:  No significant change in diffuse bilateral pulmonary opacities right greater than left

 

Endotracheal tube has been retracted with its tip 4.5 centimeters above the claire

## 2019-03-17 NOTE — PN
Subjective:  The patient is still on mechanical ventilation and attempts to wean her off.  The patien
t becomes tachycardic and tachypneic and could not tolerate the weaning off process, so we kept her o
n mechanical ventilation.



Objective:  Vital Signs:  Blood pressure 150/95, pulse 100, temperature 98.3. 

Heart:  Tachycardic.  Regular rate. 

Chest:  Bilateral end-expiratory wheezing and crackles. 

Abdomen:  Soft, nontender, nondistended.  Bowel sounds normoactive. 

Extremities:  No edema.  No cyanosis.  Peripheral pulses are felt. 

Neurologic:  Alert, oriented, and moves all her extremities.



Laboratory Data:  Blood sugar fingersticks noted in the 200-300.



Assessment And Plan:  Acute respiratory failure.  Unable to wean her off at this time.  She is probab
ly going to need a longer period of time to be weaned off.  I have talked this with her  and w
e are going to ask long-term acute care facility consult.  I think the patient needs to be in that fa
cility.  Meanwhile for her pneumonia, we will continue her current antibiotics and for her diabetes, 
we will continue her on sliding scale of regular insulin,  continue tube feeding, and we will monitor
 her electrolytes and blood count.  Look orders for details.





MFS/MODL

DD:  03/17/2019 12:24:39Voice ID:  [id number]

DT:  03/17/2019 17:13:05Report ID:  [id number]

## 2019-03-18 LAB
BUN BLD-MCNC: 103 MG/DL (ref 7–18)
COHGB MFR BLDA: 1.5 % (ref 0–1.5)
COHGB MFR BLDA: 1.7 % (ref 0–1.5)
GLUCOSE SERPLBLD-MCNC: 231 MG/DL (ref 74–106)
HCT VFR BLD CALC: 22.1 % (ref 36–45)
LYMPHOCYTES # SPEC AUTO: 0.2 K/UL (ref 0.7–4.9)
MAGNESIUM SERPL-MCNC: 2.8 MG/DL (ref 1.8–2.4)
OXYHGB MFR BLDA: 87.7 % (ref 94–97)
OXYHGB MFR BLDA: 91.6 % (ref 94–97)
PMV BLD: 9.9 FL (ref 7.6–11.3)
POTASSIUM SERPL-SCNC: 5.1 MMOL/L (ref 3.5–5.1)
RBC # BLD: 2.37 M/UL (ref 3.86–4.86)
SAO2 % BLDA: 89.7 % (ref 92–98.5)
SAO2 % BLDA: 93.5 % (ref 92–98.5)

## 2019-03-18 RX ADMIN — SODIUM CHLORIDE SCH: 0.45 INJECTION, SOLUTION INTRAVENOUS at 08:07

## 2019-03-18 RX ADMIN — ASPIRIN 81 MG SCH MG: 81 TABLET ORAL at 08:06

## 2019-03-18 RX ADMIN — ALBUTEROL SULFATE PRN MG: 2.5 SOLUTION RESPIRATORY (INHALATION) at 09:37

## 2019-03-18 RX ADMIN — HUMAN INSULIN SCH UNIT: 100 INJECTION, SOLUTION SUBCUTANEOUS at 17:00

## 2019-03-18 RX ADMIN — METHYLPREDNISOLONE SODIUM SUCCINATE SCH MG: 40 INJECTION, POWDER, FOR SOLUTION INTRAMUSCULAR; INTRAVENOUS at 00:14

## 2019-03-18 RX ADMIN — CEFTRIAXONE SCH MLS: 1 INJECTION, SOLUTION INTRAVENOUS at 08:06

## 2019-03-18 RX ADMIN — SODIUM CHLORIDE SCH MLS: 0.45 INJECTION, SOLUTION INTRAVENOUS at 00:14

## 2019-03-18 RX ADMIN — ARFORMOTEROL TARTRATE SCH MCG: 15 SOLUTION RESPIRATORY (INHALATION) at 19:40

## 2019-03-18 RX ADMIN — SODIUM CHLORIDE SCH MLS: 9 INJECTION, SOLUTION INTRAVENOUS at 09:19

## 2019-03-18 RX ADMIN — IPRATROPIUM BROMIDE SCH MG: 0.5 SOLUTION RESPIRATORY (INHALATION) at 09:37

## 2019-03-18 RX ADMIN — THIAMINE HYDROCHLORIDE SCH MG: 100 INJECTION, SOLUTION INTRAMUSCULAR; INTRAVENOUS at 09:19

## 2019-03-18 RX ADMIN — IPRATROPIUM BROMIDE SCH MG: 0.5 SOLUTION RESPIRATORY (INHALATION) at 19:40

## 2019-03-18 RX ADMIN — LEVOTHYROXINE SODIUM SCH MG: 125 TABLET ORAL at 05:52

## 2019-03-18 RX ADMIN — HUMAN INSULIN SCH UNIT: 100 INJECTION, SOLUTION SUBCUTANEOUS at 00:14

## 2019-03-18 RX ADMIN — ALBUTEROL SULFATE PRN MG: 2.5 SOLUTION RESPIRATORY (INHALATION) at 04:05

## 2019-03-18 RX ADMIN — METHYLPREDNISOLONE SODIUM SUCCINATE SCH MG: 40 INJECTION, POWDER, FOR SOLUTION INTRAMUSCULAR; INTRAVENOUS at 17:00

## 2019-03-18 RX ADMIN — MAGNESIUM OXIDE TAB 400 MG (241.3 MG ELEMENTAL MG) SCH MG: 400 (241.3 MG) TAB at 08:06

## 2019-03-18 RX ADMIN — SODIUM CHLORIDE SCH MLS: 0.9 INJECTION, SOLUTION INTRAVENOUS at 08:07

## 2019-03-18 RX ADMIN — Medication SCH ML: at 20:47

## 2019-03-18 RX ADMIN — IPRATROPIUM BROMIDE SCH MG: 0.5 SOLUTION RESPIRATORY (INHALATION) at 13:59

## 2019-03-18 RX ADMIN — SODIUM CHLORIDE SCH MLS: 0.45 INJECTION, SOLUTION INTRAVENOUS at 20:46

## 2019-03-18 RX ADMIN — Medication SCH ML: at 08:07

## 2019-03-18 RX ADMIN — ARFORMOTEROL TARTRATE SCH MCG: 15 SOLUTION RESPIRATORY (INHALATION) at 09:36

## 2019-03-18 RX ADMIN — IPRATROPIUM BROMIDE PRN MG: 0.5 SOLUTION RESPIRATORY (INHALATION) at 04:05

## 2019-03-18 RX ADMIN — SODIUM CHLORIDE SCH MG: 0.9 INJECTION, SOLUTION INTRAVENOUS at 09:19

## 2019-03-18 RX ADMIN — SODIUM CHLORIDE SCH MLS: 9 INJECTION, SOLUTION INTRAVENOUS at 17:00

## 2019-03-18 RX ADMIN — HUMAN INSULIN SCH UNIT: 100 INJECTION, SOLUTION SUBCUTANEOUS at 05:52

## 2019-03-18 RX ADMIN — METHYLPREDNISOLONE SODIUM SUCCINATE SCH MG: 40 INJECTION, POWDER, FOR SOLUTION INTRAMUSCULAR; INTRAVENOUS at 08:06

## 2019-03-18 RX ADMIN — HUMAN INSULIN SCH UNIT: 100 INJECTION, SOLUTION SUBCUTANEOUS at 12:18

## 2019-03-18 NOTE — P.CNS
Date of Consult: 03/18/19


Chief Complaint: Respiratory failure


History of Present Illness: 





Patient is 68 years of age presented to this hospital with low blood pressure 

altered mental status Mink occurring for 3 days straight admitted intubated/

renal function has progressively worsened at condition has also worsened 

according to the nursing staff unable to wean and extubate on IV fluids and 

tube feeds in addition to antibiotics chest x-ray shows a pneumonia which is 

now more prominent on the right side she is unresponsive history of COPD 

multiple medical problems














Allergies





No Known Allergies Allergy (Verified 08/19/16 11:12)


 





Home Medications: 








ALPRAZolam [Xanax] 0.5 mg PO BID 03/13/19 


Acidophilus/Bifido Longum [Lactobacillus Capsule] 1 cap PO QID 03/13/19 


Albuterol Inhaler [Ventolin Inhaler] 2 puff IH Q6H PRN 03/13/19 


Arginine/Ascorbate Sod/Annel AC [Arginaid Powder] 1 pkt PO BID 03/13/19 


Ascorbic Acid [Vitamin C] 500 mg PO BID 03/13/19 


Aspirin Chewable [Aspirin Chewable*] 81 mg PO DAILY 03/13/19 


Budesonide [Pulmicort*] 2 ml IH BID 03/13/19 


Cyanocobalamin [Vitamin B-12*] 500 mcg PO DAILY 03/13/19 


Docusate Sodium [Stool Softener] 100 mg PO BID 03/13/19 


Fluticasone/Salmeterol [Advair 250-50 Diskus] 1 puff IH BID 03/13/19 


Folic Acid 1 mg PO DAILY 03/13/19 


Furosemide [Lasix] 40 mg PO DAILY 03/13/19 


Gabapentin [Neurontin] 400 mg PO QID 03/13/19 


Ipratropium/Albuterol Sulfate [Iprat-Albut 0.5-3(2.5) mg/3 ml] 3 ml IH QID 03/13 /19 


Levothyroxine Sodium 125 mcg PO DAILY 03/13/19 


Magnesium Oxide [Magnesium] 1 tab PO DAILY 03/13/19 


Melatonin [Melatonin*] 6 mg PO BEDTIME 03/13/19 


Metoprolol Tartrate [Lopressor*] 1 tab PO BID 03/13/19 


Multivit-Min/Iron Fum/Folic AC [Multi-Vitamin-Minerals Tablet] 1 tab PO DAILY 03 /13/19 


Oxycodone HCl [Oxycontin] 20 mg PO TID 03/13/19 


Tacrolimus [Prograf] 2 cap PO BID 03/13/19 


Zinc 50 mg PO DAILY 03/13/19 


predniSONE [Deltasone] 20 mg PO DAILY 03/13/19 


traMADol HCL [Ultram*] 2 tab PO TID 03/13/19 








- Social History


Smoking Status: Never smoker


Alcohol use: No


CD- Drugs: No


Caffeine use: No


Place of Residence: Nursing Home





Review of Systems


is unable to be obtained





Physical Examination














Temp Pulse Resp BP Pulse Ox


 


 98.8 F   96 H  15   140/78   96 


 


 03/18/19 04:00  03/18/19 06:00  03/18/19 06:00  03/18/19 06:00  03/18/19 06:00








General: Unresponsive


Neck: Supple


Respiratory: Clear to auscultation bilaterally, Diminished, Crackles/rales (

Crackles on the right side)


Cardiovascular: No edema, Regular rate/rhythm, Normal S1 S2


Gastrointestinal: Normal bowel sounds, Soft and benign





- Problems


(1) Respiratory failure


Current Visit: Yes   Status: Acute   


Plan: 


Patient is 68 years of age admitted with low blood pressure altered mental 

status multiple medical problems including COPD she has a pneumonia on the 

right side cultures are all negative including sputum cultures recent sputum 

culture was also negative patient's renal function is improving patient is 

hypoxic hypercapnic and anemic from I have added meropenem Dc Rocephin and 

Zithromax continue with vancomycin maximize bronchodilator therapy continue 

with IV fluids creatinine is improving and IV thiamine urinalysis did not show 

any evidence of infection on admission thyroid function tests is satisfactory


Qualifiers: 


   Chronicity: unspecified

## 2019-03-18 NOTE — RAD REPORT
EXAM DESCRIPTION:  [facility] Single View3/18/2019 6:59 am

 

CLINICAL HISTORY:   shortness of breath

 

COMPARISON:   March 17th

 

FINDINGS:  Endotracheal nasogastric tubes remain in place.

 

Minimal improvement in bilateral pulmonary opacities has occurred. Small pleural effusions suspected

 

IMPRESSION:   Minimal improvement in diffuse bilateral pulmonary opacities

## 2019-03-18 NOTE — PN
Subjective:  The patient is still on mechanical ventilation.  No new complaint today.



Physical Examination:

Vital Signs:  Blood pressure 140/80, pulse 96, temperature 98.8. 

Heart:  Regular rate and rhythm. 

Chest:  Bilateral crackles with end-expiratory wheezing. 

Abdomen:  Soft, benign.  Bowel sounds are active. 

Extremities:  No edema.  No cyanosis.  Peripheral pulses are felt. 

Neurological:  Alert.  Right now she was sedated, however she moves all her extremities.



Laboratory Data:  Hemoglobin 7.1, hematocrit 22.1, platelets 258, white cell count 9.4.  , cre
atinine 2.16.  Blood sugar fingersticks noted.  ABGs, pH 7.41, pCO2 48, PO2 59, saturation 89.7% at 3
0%.



Assessment And Plan:  

1.Acute drop in hemoglobin and hematocrit, stress ulcer is likely, put the patient on Protonix and w
e will transfuse her with 2 units of blood to improve her general condition to help with extubation p
rocess if we can.

2.I appreciate Dr. Soares's input about her pneumonia and respiratory failure to change her antibi
otic to meropenem.  We will follow his recommendations.

3.Type 2 diabetes.  We will keep her on sliding scale.

4.Acute kidney failure.  We will monitor her electrolytes and her renal function.  Expect to improve
 with improvement in her general condition.  Look orders for details.





MFS/MODL

DD:  03/18/2019 12:26:15Voice ID:  [id number]

DT:  03/18/2019 17:21:19Report ID:  [id number]

## 2019-03-19 LAB
BUN BLD-MCNC: 116 MG/DL (ref 7–18)
COHGB MFR BLDA: 1.6 % (ref 0–1.5)
GLUCOSE SERPLBLD-MCNC: 247 MG/DL (ref 74–106)
HCT VFR BLD CALC: 21.9 % (ref 36–45)
LYMPHOCYTES # SPEC AUTO: 0.1 K/UL (ref 0.7–4.9)
MAGNESIUM SERPL-MCNC: 2.8 MG/DL (ref 1.8–2.4)
OXYHGB MFR BLDA: 90.2 % (ref 94–97)
PMV BLD: 9.9 FL (ref 7.6–11.3)
POTASSIUM SERPL-SCNC: 6.1 MMOL/L (ref 3.5–5.1)
RBC # BLD: 2.36 M/UL (ref 3.86–4.86)
SAO2 % BLDA: 92.1 % (ref 92–98.5)

## 2019-03-19 RX ADMIN — THIAMINE HYDROCHLORIDE SCH MG: 100 INJECTION, SOLUTION INTRAMUSCULAR; INTRAVENOUS at 08:56

## 2019-03-19 RX ADMIN — ARFORMOTEROL TARTRATE SCH MCG: 15 SOLUTION RESPIRATORY (INHALATION) at 19:26

## 2019-03-19 RX ADMIN — ALBUMIN (HUMAN) SCH MG: 5 SOLUTION INTRAVENOUS at 18:06

## 2019-03-19 RX ADMIN — ALBUMIN (HUMAN) SCH MG: 5 SOLUTION INTRAVENOUS at 08:55

## 2019-03-19 RX ADMIN — IPRATROPIUM BROMIDE SCH MG: 0.5 SOLUTION RESPIRATORY (INHALATION) at 07:28

## 2019-03-19 RX ADMIN — IPRATROPIUM BROMIDE SCH MG: 0.5 SOLUTION RESPIRATORY (INHALATION) at 14:08

## 2019-03-19 RX ADMIN — VANCOMYCIN HYDROCHLORIDE SCH: 1 INJECTION, POWDER, FOR SOLUTION INTRAVENOUS at 12:00

## 2019-03-19 RX ADMIN — HUMAN INSULIN SCH UNIT: 100 INJECTION, SOLUTION SUBCUTANEOUS at 12:24

## 2019-03-19 RX ADMIN — Medication SCH ML: at 20:26

## 2019-03-19 RX ADMIN — SODIUM CHLORIDE SCH: 0.45 INJECTION, SOLUTION INTRAVENOUS at 00:23

## 2019-03-19 RX ADMIN — HUMAN INSULIN SCH: 100 INJECTION, SOLUTION SUBCUTANEOUS at 18:00

## 2019-03-19 RX ADMIN — IPRATROPIUM BROMIDE SCH MG: 0.5 SOLUTION RESPIRATORY (INHALATION) at 19:26

## 2019-03-19 RX ADMIN — SODIUM CHLORIDE SCH MG: 0.9 INJECTION, SOLUTION INTRAVENOUS at 08:55

## 2019-03-19 RX ADMIN — HUMAN INSULIN SCH UNIT: 100 INJECTION, SOLUTION SUBCUTANEOUS at 00:22

## 2019-03-19 RX ADMIN — SODIUM CHLORIDE SCH MLS: 9 INJECTION, SOLUTION INTRAVENOUS at 00:22

## 2019-03-19 RX ADMIN — Medication SCH ML: at 08:57

## 2019-03-19 RX ADMIN — METHYLPREDNISOLONE SODIUM SUCCINATE SCH MG: 40 INJECTION, POWDER, FOR SOLUTION INTRAMUSCULAR; INTRAVENOUS at 00:22

## 2019-03-19 RX ADMIN — HALOPERIDOL LACTATE PRN MG: 5 INJECTION, SOLUTION INTRAMUSCULAR at 16:30

## 2019-03-19 RX ADMIN — SODIUM CHLORIDE SCH MLS: 9 INJECTION, SOLUTION INTRAVENOUS at 08:56

## 2019-03-19 RX ADMIN — HUMAN INSULIN SCH UNIT: 100 INJECTION, SOLUTION SUBCUTANEOUS at 06:44

## 2019-03-19 RX ADMIN — IPRATROPIUM BROMIDE SCH MG: 0.5 SOLUTION RESPIRATORY (INHALATION) at 01:50

## 2019-03-19 RX ADMIN — ARFORMOTEROL TARTRATE SCH MCG: 15 SOLUTION RESPIRATORY (INHALATION) at 07:28

## 2019-03-19 RX ADMIN — SODIUM CHLORIDE SCH MLS: 9 INJECTION, SOLUTION INTRAVENOUS at 16:32

## 2019-03-19 RX ADMIN — Medication SCH ML: at 20:27

## 2019-03-19 RX ADMIN — LEVOTHYROXINE SODIUM SCH MG: 125 TABLET ORAL at 06:45

## 2019-03-19 RX ADMIN — MAGNESIUM OXIDE TAB 400 MG (241.3 MG ELEMENTAL MG) SCH MG: 400 (241.3 MG) TAB at 08:56

## 2019-03-19 NOTE — P.PN
Subjective


Date of Service: 03/19/19


Chief Complaint: Respiratory failure


Subjective: Improving (Patient is more alert today failed BiPAP weaning trial)





Review of Systems


is unable to be obtained





Physical Examination





- Vital Signs


Temperature: 99 F


Blood Pressure: 144/87


Pulse: 95


Respirations: 16


Pulse Ox (%): 99





- Physical Exam


General: Alert


Respiratory: Clear to auscultation bilaterally, Diminished


Cardiovascular: Regular rate/rhythm, Edema





Assessment & Plan





- Problems (Diagnosis)


(1) Respiratory failure


Current Visit: Yes   Status: Acute   


Plan: 


Patient admitted with respiratory failure right lung pneumonia cultures 

negative decrease PEEP to 5 tried to wean her off the ventilator 

hemodynamically stable Dc steroids Dc IV fluids renal function is very abnormal 

no improvement with IV fluids trial of Lasix blood sugar elevated


Qualifiers: 


   Chronicity: unspecified

## 2019-03-19 NOTE — PN
Subjective:  No change in status.  The patient is still intubated.



Objective:  Vital Signs:  Blood pressure 140/85, pulse 88, temperature 99. 

Heart:  Regular rate and rhythm. 

Chest:  Bilateral crackles. 

Abdomen: Soft, benign.  Bowel sounds are active. 

Extremities:  No edema.  No cyanosis. 

Neurologic:  The patient is alert, moves all her extremities and has good eye contact.



Laboratory Data:  ABGs, pH 7.40, pCO2 47.5, pO2 64.5, saturation 92%.  CBC, white cell count 9.1, hem
oglobin 7.2, hematocrit 21.9, platelets 257.  Chemistry, potassium 6.1, , creatinine 2.34.



Assessment And Plan:  

1.Anemia.  Hemoglobin is stable.  I think the patient had stress ulcer with treating, but is stabili
zed now.  She is off anticoagulation.  We will continue that.

2.Hyperkalemia with acute renal failure.  We will go ahead and give the patient Kayexalate.  We will
 Monitor her potassium.

3.Pneumonia and acute respiratory failure.  Appreciate Dr. Soares's input.  Continue current antib
iotics.  Blood sugar fingersticks noted.  The patient is on regular insulin sliding scale.  Look dusty rubin for details.





MFS/MODL

DD:  03/19/2019 11:42:21Voice ID:  [id number]

DT:  03/19/2019 16:28:21Report ID:  [id number]

## 2019-03-20 LAB
BUN BLD-MCNC: 122 MG/DL (ref 7–18)
GLUCOSE SERPLBLD-MCNC: 162 MG/DL (ref 74–106)
HCT VFR BLD CALC: 22.8 % (ref 36–45)
HCT VFR BLD CALC: 27.5 % (ref 36–45)
LYMPHOCYTES # SPEC AUTO: 0.8 K/UL (ref 0.7–4.9)
MAGNESIUM SERPL-MCNC: 2.8 MG/DL (ref 1.8–2.4)
MORPHOLOGY BLD-IMP: (no result)
PMV BLD: 10 FL (ref 7.6–11.3)
POTASSIUM SERPL-SCNC: 5.4 MMOL/L (ref 3.5–5.1)
RBC # BLD: 2.46 M/UL (ref 3.86–4.86)

## 2019-03-20 PROCEDURE — 30233N1 TRANSFUSION OF NONAUTOLOGOUS RED BLOOD CELLS INTO PERIPHERAL VEIN, PERCUTANEOUS APPROACH: ICD-10-PCS

## 2019-03-20 RX ADMIN — HUMAN INSULIN SCH UNIT: 100 INJECTION, SOLUTION SUBCUTANEOUS at 18:17

## 2019-03-20 RX ADMIN — ARFORMOTEROL TARTRATE SCH MCG: 15 SOLUTION RESPIRATORY (INHALATION) at 08:10

## 2019-03-20 RX ADMIN — SODIUM CHLORIDE SCH MLS: 9 INJECTION, SOLUTION INTRAVENOUS at 18:15

## 2019-03-20 RX ADMIN — HALOPERIDOL LACTATE PRN MG: 5 INJECTION, SOLUTION INTRAMUSCULAR at 10:38

## 2019-03-20 RX ADMIN — HUMAN INSULIN SCH UNIT: 100 INJECTION, SOLUTION SUBCUTANEOUS at 00:04

## 2019-03-20 RX ADMIN — ARFORMOTEROL TARTRATE SCH MCG: 15 SOLUTION RESPIRATORY (INHALATION) at 19:47

## 2019-03-20 RX ADMIN — MAGNESIUM OXIDE TAB 400 MG (241.3 MG ELEMENTAL MG) SCH: 400 (241.3 MG) TAB at 08:48

## 2019-03-20 RX ADMIN — THIAMINE HYDROCHLORIDE SCH MG: 100 INJECTION, SOLUTION INTRAMUSCULAR; INTRAVENOUS at 08:47

## 2019-03-20 RX ADMIN — HUMAN INSULIN SCH: 100 INJECTION, SOLUTION SUBCUTANEOUS at 11:55

## 2019-03-20 RX ADMIN — ALBUMIN (HUMAN) SCH MG: 5 SOLUTION INTRAVENOUS at 08:47

## 2019-03-20 RX ADMIN — SODIUM CHLORIDE SCH MG: 0.9 INJECTION, SOLUTION INTRAVENOUS at 08:48

## 2019-03-20 RX ADMIN — IPRATROPIUM BROMIDE SCH MG: 0.5 SOLUTION RESPIRATORY (INHALATION) at 19:47

## 2019-03-20 RX ADMIN — SODIUM CHLORIDE SCH MLS: 9 INJECTION, SOLUTION INTRAVENOUS at 08:50

## 2019-03-20 RX ADMIN — Medication SCH ML: at 08:49

## 2019-03-20 RX ADMIN — HUMAN INSULIN SCH UNIT: 100 INJECTION, SOLUTION SUBCUTANEOUS at 06:16

## 2019-03-20 RX ADMIN — Medication SCH ML: at 20:51

## 2019-03-20 RX ADMIN — LEVOTHYROXINE SODIUM SCH MG: 125 TABLET ORAL at 06:16

## 2019-03-20 RX ADMIN — SODIUM CHLORIDE SCH MLS: 9 INJECTION, SOLUTION INTRAVENOUS at 00:04

## 2019-03-20 RX ADMIN — HALOPERIDOL LACTATE PRN MG: 5 INJECTION, SOLUTION INTRAMUSCULAR at 18:09

## 2019-03-20 RX ADMIN — HUMAN INSULIN SCH: 100 INJECTION, SOLUTION SUBCUTANEOUS at 23:49

## 2019-03-20 RX ADMIN — IPRATROPIUM BROMIDE SCH MG: 0.5 SOLUTION RESPIRATORY (INHALATION) at 14:07

## 2019-03-20 RX ADMIN — IPRATROPIUM BROMIDE SCH MG: 0.5 SOLUTION RESPIRATORY (INHALATION) at 08:10

## 2019-03-20 RX ADMIN — IPRATROPIUM BROMIDE SCH MG: 0.5 SOLUTION RESPIRATORY (INHALATION) at 02:00

## 2019-03-20 NOTE — RAD REPORT
EXAM DESCRIPTION:  [facility] Single View3/20/2019 6:51 am

 

CLINICAL HISTORY:  Chest pain

 

COMPARISON:  March 18, 2019

 

FINDINGS:   Endotracheal nasogastric tubes in good position

 

Mild improvement in the bilateral pulmonary opacities. No other change noted

 

IMPRESSION:  Mild improvement in the bilateral pulmonary opacities

## 2019-03-20 NOTE — PN
Subjective:  The patient is seen on mechanical ventilation.  No significant change.



Objective:  Vital Signs:  Blood pressure 115/55, pulse 67, temperature 97. 

Heart:  Regular rate and rhythm. 

Chest:  Bilateral crackles. 

Abdomen:  Soft, benign.  Bowel sounds are active. 

Extremities:  No edema.  No cyanosis.  Peripheral pulses are felt. 

Neurologic:  Patient is right now sedated.  She moves all her extremities.



Laboratory Data:  White cell count 14.6, hemoglobin 7.4, hematocrit 22.8, platelets 278.  Potassium 5
.4, , creatinine 2.36.  Blood sugar fingersticks noted.



Assessment And Plan:  

1.Acute respiratory failure, hard to wean off mechanical ventilation family waiting on family to dec
angie about that with the patient's  and  involved.  Meanwhile, continue 
al ventilation support.

2.Anemia, likely from blood loss, possible gastrointestinal.  We will go ahead and transfuse 1 unit 
of blood.

3.Acute renal failure, likely from sepsis and poor general condition.

4.We are awaiting on family decision about DNR and about holding withdrawal.  From that standpoint p
ending the understanding of the patient's wishes and what they wanted to do.  Meanwhile, continue cur
rent support and treatment.





MFS/MODL

DD:  03/20/2019 12:17:56Voice ID:  [id number]

DT:  03/20/2019 16:06:43Report ID:  [id number]

## 2019-03-20 NOTE — RAD REPORT
EXAM DESCRIPTION:  US - Renal Ultrasound-Complete - 3/20/2019 1:03 pm

 

CLINICAL HISTORY:  . Acute and chronic renal failure

 

COMPARISON:  None.

 

FINDINGS:  The right kidney measures 8 centimeters with an increased echotexture

 

The left kidney measures 8 centimeters with an increased echotexture

 

Hydronephrosis is not seen. Bladder is not well evaluated as it is decompressed

 

Small amount of ascites

 

IMPRESSION:  Increased renal echotexture consistent with parenchymal disease

## 2019-03-20 NOTE — P.PN
Subjective


Date of Service: 03/20/19


Chief Complaint: Respiratory failure





No change unable to wean and extubate patient is minimally responsive off 

propofol and other sedative so only getting Haldol p.r.n. renal function is 

worse chest x-ray shows an improvement patient is on minimal oxygen





Review of Systems


is unable to be obtained





Physical Examination





- Vital Signs


Temperature: 97 F


Blood Pressure: 113/54


Pulse: 67


Respirations: 14


Pulse Ox (%): 98





- Physical Exam


General: Unresponsive


Respiratory: Clear to auscultation bilaterally


Cardiovascular: No edema, Regular rate/rhythm





Assessment & Plan





- Problems (Diagnosis)


(1) Respiratory failure


Current Visit: Yes   Status: Acute   


Plan: 


Patient admitted with respiratory failure presumed pneumonia chest x-ray has 

improved renal function is worse cultures are so far negative vital signs 

stable unable to wean and extubate will discuss with the daughter regarding 

terminal weaning recheck pro calcitonin


Qualifiers: 


   Chronicity: unspecified 





(2) Pneumonia


Current Visit: Yes   Status: Acute   


Plan: 


Patient has a pneumonia at which seems to be improving predominantly on the 

right side white count still mildly elevated


Qualifiers: 


   Laterality: right   Lung location: unspecified part of lung

## 2019-03-20 NOTE — RAD REPORT
EXAM DESCRIPTION:  RAD - Abdomen 1 View (KUB) - 3/20/2019 7:10 pm

 

CLINICAL HISTORY:  OG tube placement

Pain

 

COMPARISON:  No comparisons

 

FINDINGS:  The tip of the enteric tube is in the stomach.

## 2019-03-21 LAB
BLD SMEAR INTERP: (no result)
BUN BLD-MCNC: 121 MG/DL (ref 7–18)
GLUCOSE SERPLBLD-MCNC: 205 MG/DL (ref 74–106)
HCT VFR BLD CALC: 27.8 % (ref 36–45)
LYMPHOCYTES # SPEC AUTO: 0.5 K/UL (ref 0.7–4.9)
MAGNESIUM SERPL-MCNC: 2.7 MG/DL (ref 1.8–2.4)
MORPHOLOGY BLD-IMP: (no result)
PMV BLD: 9.9 FL (ref 7.6–11.3)
POTASSIUM SERPL-SCNC: 4.5 MMOL/L (ref 3.5–5.1)
RBC # BLD: 3 M/UL (ref 3.86–4.86)

## 2019-03-21 RX ADMIN — Medication SCH ML: at 08:18

## 2019-03-21 RX ADMIN — HUMAN INSULIN SCH UNIT: 100 INJECTION, SOLUTION SUBCUTANEOUS at 05:42

## 2019-03-21 RX ADMIN — HUMAN INSULIN SCH: 100 INJECTION, SOLUTION SUBCUTANEOUS at 18:00

## 2019-03-21 RX ADMIN — ARFORMOTEROL TARTRATE SCH: 15 SOLUTION RESPIRATORY (INHALATION) at 20:00

## 2019-03-21 RX ADMIN — HALOPERIDOL LACTATE PRN MG: 5 INJECTION, SOLUTION INTRAMUSCULAR at 03:58

## 2019-03-21 RX ADMIN — IPRATROPIUM BROMIDE SCH MG: 0.5 SOLUTION RESPIRATORY (INHALATION) at 01:25

## 2019-03-21 RX ADMIN — SODIUM CHLORIDE SCH: 9 INJECTION, SOLUTION INTRAVENOUS at 17:00

## 2019-03-21 RX ADMIN — ARFORMOTEROL TARTRATE SCH MCG: 15 SOLUTION RESPIRATORY (INHALATION) at 07:38

## 2019-03-21 RX ADMIN — LEVOTHYROXINE SODIUM SCH MG: 125 TABLET ORAL at 05:42

## 2019-03-21 RX ADMIN — IPRATROPIUM BROMIDE SCH MG: 0.5 SOLUTION RESPIRATORY (INHALATION) at 07:38

## 2019-03-21 RX ADMIN — MAGNESIUM OXIDE TAB 400 MG (241.3 MG ELEMENTAL MG) SCH MG: 400 (241.3 MG) TAB at 08:17

## 2019-03-21 RX ADMIN — SODIUM CHLORIDE SCH MLS: 9 INJECTION, SOLUTION INTRAVENOUS at 08:16

## 2019-03-21 RX ADMIN — IPRATROPIUM BROMIDE SCH MG: 0.5 SOLUTION RESPIRATORY (INHALATION) at 13:57

## 2019-03-21 RX ADMIN — SODIUM CHLORIDE SCH MG: 0.9 INJECTION, SOLUTION INTRAVENOUS at 08:17

## 2019-03-21 RX ADMIN — THIAMINE HYDROCHLORIDE SCH MG: 100 INJECTION, SOLUTION INTRAMUSCULAR; INTRAVENOUS at 08:17

## 2019-03-21 RX ADMIN — ALBUTEROL SULFATE PRN MG: 2.5 SOLUTION RESPIRATORY (INHALATION) at 01:25

## 2019-03-21 RX ADMIN — Medication SCH ML: at 20:15

## 2019-03-21 RX ADMIN — SODIUM CHLORIDE SCH MLS: 9 INJECTION, SOLUTION INTRAVENOUS at 00:04

## 2019-03-21 RX ADMIN — IPRATROPIUM BROMIDE SCH: 0.5 SOLUTION RESPIRATORY (INHALATION) at 20:00

## 2019-03-21 RX ADMIN — ALBUTEROL SULFATE PRN MG: 2.5 SOLUTION RESPIRATORY (INHALATION) at 13:57

## 2019-03-21 RX ADMIN — HUMAN INSULIN SCH UNIT: 100 INJECTION, SOLUTION SUBCUTANEOUS at 13:05

## 2019-03-21 NOTE — P.PN
Subjective


Date of Service: 03/21/19


Chief Complaint: Respiratory failure


Patient is alert responsive and cooperative again failed ventilator weaning 

trial yesterday





Review of Systems


is unable to be obtained





Physical Examination





- Vital Signs


Temperature: 98.0 F


Blood Pressure: 141/82


Pulse: 86


Respirations: 16


Pulse Ox (%): 100





- Physical Exam


General: Alert, Oriented x3


Respiratory: Clear to auscultation bilaterally


Cardiovascular: No edema, Regular rate/rhythm





Assessment & Plan





- Problems (Diagnosis)


(1) Respiratory failure


Current Visit: Yes   Status: Acute   


Plan: 


Patient admitted with respiratory failure and renal function improving although 

BUN is still elevated pro calcitonin level is normal chest x-ray shows 

bilateral opacities plan to wean off the ventilator Dc vancomycin continue with 

meropenem Dc Protonix patient is not at risk for GI bleeding he is DVT 

prophylaxis patient is on minimal oxygen


Qualifiers: 


   Chronicity: unspecified 





(2) Pneumonia


Current Visit: Yes   Status: Acute   


Plan: 


Patient has a pneumonia at which seems to be improving predominantly on the 

right side white count still mildly elevated


Qualifiers: 


   Laterality: right   Lung location: unspecified part of lung

## 2019-03-21 NOTE — PN
Subjective:  The patient is well, alert, good eye movement, and she answered nodding her head to ques
tions.



Physical Examination:

Vital Signs:  Blood pressure 140/82, pulse 86, temperature 98. 

Heart:  Regular rate and rhythm. 

Chest:  Bilateral expiratory wheezing. 

Abdomen:  Soft, nontender.  Bowel sounds are active. 

Extremities:  No edema.  No cyanosis.  Peripheral pulses are felt. 

Neurologic:  Alert.  The patient noted that knew all her doctor and she is in the hospital.  She nodd
ed yes with that.  She is intubated and she moves all her extremities.



Chest X-ray:  No acute change and has bilateral pulmonary opacities.



Laboratory Data:  Hemoglobin stable at 9.0 after 1 unit transfusion, hematocrit 27.8, platelets 259, 
white cell count 13.7.



Assessment And Plan:  

1.Anemia, stable.  No evidence of acute bleeding at this time, likely iron deficiency.  She may have
 had a stress ulceration that has stopped.

2.Acute respiratory failure with pneumonia.  Continue current antibiotics.  Still trying to wean the
 patient off and pending family and patient's decision about keeping her on mechanical ventilation.

3.Type 2 diabetes.  We will keep the patient on insulin sliding scale.  Look orders for details.





MFS/MODL

DD:  03/21/2019 11:36:43Voice ID:  [id number]

DT:  03/21/2019 15:59:26Report ID:  [id number]

## 2019-03-21 NOTE — RAD REPORT
EXAM DESCRIPTION:  RAD - Chest Single View - 3/21/2019 6:35 am

 

CLINICAL HISTORY:  resp failure

Chest pain.

 

COMPARISON:  Abdomen 1 View (KUB) dated 3/20/2019; Chest Single View dated 3/20/2019; Chest Single Vi
ew dated 3/18/2019; Chest Single View dated 3/17/2019

 

FINDINGS:  Portable technique limits examination quality.

 

Tip of the ET tube is above the claire. Enteric tube descends into the stomach. Extensive bilateral p
ulmonary opacities again noted with bilateral pleural effusions, small, appearing essentially unchang
ed. Heart size is mildly prominent.

## 2019-03-22 NOTE — CON
Date of Consultation:  03/21/2019



Chief Complaint:  Shortness of breath.



Reason For Consult:  Acute kidney injury.



History Of Present Illness:  The patient is unable to provide history.  History 
is obtained mainly from the chart.  A 68-year-old woman with past medical 
history of liver transplant due to hepatitis C, liver cirrhosis back in 1999, 
on tacrolimus.  Congestive heart failure, hypothyroidism, hypertension, COPD, 
chronic renal failure.  The patient was admitted for shortness of breath of 3 
days duration.  The patient was hypoxic and required intubation.  The patient's 
creatinine was 1.5 in January trending up to 2.7 and BUN of 121.  Right now the 
patient is alert, not in distress, nonoliguric, nausea, vomiting.  No diarrhea.



Review of Systems:

As above.



Past Medical History:  As in HPI.



Social History:  Ex-smoker.  No alcohol or other drug abuse.



Family History:  Noncontributory.



Medications:  See MAR.



Allergies:  NO KNOWN DRUG ALLERGIES.



Physical Examination:

General:  The patient is intubated, no distress. 

Chest:  Decreased air entry bilaterally. 

Heart:  Regular rate and rhythm.  Normal S1 and S2. 

Abdomen:  Soft, nontender. 

Extremities:  Left arm swelling.



Laboratory Data:  Labs are significant for white count 13.7, hemoglobin of 9.  
Sodium 145, bicarb 33, , creatinine 2.4, GFR of 20, glucose 205, calcium 
7.5, and albumin of 2.8.



Assessment And Plan:  

1.   Chronic kidney injury and chronic kidney disease.  Creatinine in January 
was 1.5, right now creatinine is up to 2.7 with .  possibly  due to  
fluid overload and high protein from tube feeding.  We will reduce tube 
feeding. 

2.   Due to uremia, the patient may require hemodialysis, but due to her 
overall condition, the risks is outweigh the benefit.  I discussed this with 
the .  Waiting for family decision tomorrow regarding her goal of care.

no need for urgent HD at this time 

3.   Respiratory failure, failed extubation.  Continued on vent. 

4.   Pneumonia, improving. 

5.   Congestive heart failure.  The patient is responding to Lasix.

6.   History of liver transplant, off tacrolimus at the moment.  Overall poor 
prognosis.





JUANIS/GABBY

DD:  03/21/2019 20:07:51   Voice ID:  [id number]

DT:  03/22/2019 02:20:12   Report ID:  [id number]

CARMELLA

## 2019-03-25 NOTE — DS
Date of Discharge:  2019



History Of Present Illness:  A 68-year-old female who is a liver transplant patient who was admitted 
to the hospital because of acute respiratory failure secondary to pneumonia.



Past Medical History:  As per admit note.



Social History:  As per admit note.



Family History:  As per admit note.



Medications:  As per admit note.



Allergies:  AS PER ADMIT NOTE.



Physical Examination:

As per admit note.



Diagnostic Data:  As per admit note.



Hospital Course:  The patient was admitted to the hospital ICU after she has been intubated in the em
ergency room because of acute respiratory failure and hypoxia.  The patient was put on IV antibiotic 
and mechanical ventilation, and we followed her electrolytes and her blood count.  The patient gradua
lly improved from the standpoint of her ammonia and her ABGs showed improvement on mechanical ventila
tion; however, multiple trials to wean the patient off the mechanical ventilation were failed because
 the patient could not tolerate breathing on her own, so we kept her on mechanical ventilation.  At o
ne point, her hemoglobin had dropped to 7.1.  We went ahead and transfused her with 1 unit of packed 
red blood cells to improve her general condition, to help weaning her off, __________ probable stress
 ulcer, and __________ after the transfusion.  Also, we managed her for her chronic medical illnesses
.  We then put her on tube feeding and managed her fluid status.  She showed also acute renal failure
 that was thought secondary to her poor general condition and __________overload.  We asked Dr. Jose F kate after a few days to see the patient and manage her with us and put her on meropenem IV antibiotic
 and managed her with us; however, the patient still was hard to wean off the mechanical ventilation.
  Nephrology also was requested to see the patient because of her renal failure.  They thought that t
he patient is responding to IV Lasix.  During all this, the family has indicated that the patient did
 not want to be resuscitated should her heart stops or she stops breathing.  However, she was intubat
ed at that time and  were working with us including this.  The patient then was ______
____ trial of an hour to wean her off mechanical ventilation, she tolerated that well, and she comple
sunny the criteria to be extubated, and the patient was extubated.  After that, the patient and the [facility] have indicated that they want the patient to be DNR, and so the patient was put on the DNR status
.  After that, the patient was breathing on her own for some hours.  She became tired again, put her 
on oxygen protocol, but no intubation, no resuscitation, and the patient  __________.





MFS/MODL

DD:  2019 14:56:41Voice ID:  [id number]

DT:  2019 06:10:41Report ID:  [id number]